# Patient Record
Sex: MALE | Race: ASIAN | NOT HISPANIC OR LATINO | Employment: FULL TIME | URBAN - METROPOLITAN AREA
[De-identification: names, ages, dates, MRNs, and addresses within clinical notes are randomized per-mention and may not be internally consistent; named-entity substitution may affect disease eponyms.]

---

## 2017-09-09 ENCOUNTER — ALLIED HEALTH/NURSE VISIT (OUTPATIENT)
Dept: NURSING | Facility: CLINIC | Age: 47
End: 2017-09-09
Payer: COMMERCIAL

## 2017-09-09 DIAGNOSIS — Z23 NEED FOR PROPHYLACTIC VACCINATION AND INOCULATION AGAINST INFLUENZA: Primary | ICD-10-CM

## 2017-09-09 PROCEDURE — 90471 IMMUNIZATION ADMIN: CPT

## 2017-09-09 PROCEDURE — 90686 IIV4 VACC NO PRSV 0.5 ML IM: CPT

## 2017-09-09 NOTE — PROGRESS NOTES
Injectable Influenza Immunization Documentation    1.  Are you sick today? (Fever of 100.5 or higher on the day of the clinic)   No    2.  Have you ever had Guillain-Dallas Syndrome within 6 weeks of an influenza vaccionation?  No    3. Do you have a life-threatening allergy to eggs?  No    4. Do you have a life-threatening allergy to a component of the vaccine? May include antibiotics, gelatin or latex.  No     5. Have you ever had a reaction to a dose of flu vaccine that needed immediate medical attention?  No     Form completed by juarez Barajas CMA

## 2017-09-09 NOTE — MR AVS SNAPSHOT
After Visit Summary   9/9/2017    Lizz Recinos    MRN: [id number]           Patient Information     Date Of Birth          1970        Visit Information        Provider Department      9/9/2017 8:15 AM RV FLU CLINIC NURSE [facility]        Today's Diagnoses     Need for prophylactic vaccination and inoculation against influenza    -  1       Follow-ups after your visit        Who to contact     If you have questions or need follow up information about today's clinic visit or your schedule please contact [facility] directly at [phone].  Normal or non-critical lab and imaging results will be communicated to you by [employer], letter or phone within 4 business days after the clinic has received the results. If you do not hear from us within 7 days, please contact the clinic through [employer] or phone. If you have a critical or abnormal lab result, we will notify you by phone as soon as possible.  Submit refill requests through [employer] or call your pharmacy and they will forward the refill request to us. Please allow 3 business days for your refill to be completed.          Additional Information About Your Visit        MyChart Information     [employer] gives you secure access to your electronic health record. If you see a primary care provider, you can also send messages to your care team and make appointments. If you have questions, please call your primary care clinic.  If you do not have a primary care provider, please call [phone] and they will assist you.        Care EveryWhere ID     This is your Care EveryWhere ID. This could be used by other organizations to access your [place] medical records  [id number]         Blood Pressure from Last 3 Encounters:   10/14/16 104/68   08/28/15 100/64   02/09/11 112/80    Weight from Last 3 Encounters:   10/14/16 113 lb (51.3 kg)   08/28/15 110 lb (49.9 kg)   02/09/11 113 lb (51.3 kg)              We Performed the  Following     FLU VAC, SPLIT VIRUS IM > 3 YO (QUADRIVALENT) [91592]     Vaccine Administration, Initial [27969]        Primary Care Provider Office Phone # Fax #    Lauri Beltrán [phone]       [facility] [facility] [address]        Equal Access to Services     MARLON DEVLIN : Hadii aad ku hadasho Soomaali, waaxda luqadaha, qaybta kaalmada adeegyada, waxay radhain hayaan aderoxy worrellgeorgiaana cristina sanchez. So [facility] [phone].    ATENCIÓN: Si habla español, tiene a vallejo disposición servicios gratuitos de asistencia lingüística. Llame al [phone].    We comply with applicable federal civil rights laws and [place] laws. We do not discriminate on the basis of race, color, national origin, age, disability sex, sexual orientation or gender identity.            Thank you!     Thank you for choosing [facility]  for your care. Our goal is always to provide you with excellent care. Hearing back from our patients is one way we can continue to improve our services. Please take a few minutes to complete the written survey that you may receive in the mail after your visit with us. Thank you!             Your Updated Medication List - Protect others around you: Learn how to safely use, store and throw away your medicines at www.disposemymeds.org.          This list is accurate as of: 9/9/17  8:24 AM.  Always use your most recent med list.                   Brand Name Dispense Instructions for use Diagnosis    MULTIVITAMINS PO      Take  by mouth daily.

## 2018-09-17 NOTE — PROGRESS NOTES
SUBJECTIVE:   CC: Lizz Recinos is an 47 year old male who presents for preventative health visit.     Healthy Habits:    Do you get at least three servings of calcium containing foods daily (dairy, green leafy vegetables, etc.)? yes    Amount of exercise or daily activities, outside of work: some    Problems taking medications regularly not applicable    Medication side effects: No    Have you had an eye exam in the past two years? yes    Do you see a dentist twice per year? yes    Do you have sleep apnea, excessive snoring or daytime drowsiness?no     Lizz is doing well but mentions feeling symptoms of a cold and a concern for strep. He complains of a hissing sound in his left ear which he doesn't notice it if he's focusing on something else. He notes that he might have a bad breath.      Today's PHQ-2 Score:   PHQ-2 ( 1999 Pfizer) 9/18/2018 10/14/2016   Q1: Little interest or pleasure in doing things 0 0   Q2: Feeling down, depressed or hopeless 0 0   PHQ-2 Score 0 0     Abuse: Current or Past(Physical, Sexual or Emotional)- No  Do you feel safe in your environment - Yes    Social History   Substance Use Topics     Smoking status: Never Smoker     Smokeless tobacco: Never Used     Alcohol use 1.0 oz/week     2 Standard drinks or equivalent per week      If you drink alcohol do you typically have >3 drinks per day or >7 drinks per week? No                      Last PSA:   PSA   Date Value Ref Range Status   08/28/2015 0.81 0 - 4 ug/L Final       Reviewed orders with patient. Reviewed health maintenance and updated orders accordingly - Yes  Labs reviewed in EPIC    Reviewed and updated as needed this visit by clinical staff  Tobacco  Allergies  Meds  Problems  Med Hx  Surg Hx  Fam Hx  Soc Hx          Reviewed and updated as needed this visit by Provider  Allergies  Meds  Problems          ROS:  Constitutional, HEENT, cardiovascular, pulmonary, GI, , musculoskeletal, neuro, skin, endocrine and psych  "systems are negative, except as otherwise noted.    This document serves as a record of the services and decisions personally performed and made by Lauri Beltrán MD. It was created on his behalf by Matt Kruse, a trained medical scribe. The creation of this document is based the provider's statements to the medical scribe.  Scribe Matt Kruse 10:40 AM, September 18, 2018    OBJECTIVE:   /70  Pulse 85  Temp 98.3  F (36.8  C) (Oral)  Ht 1.575 m (5' 2\")  Wt 49.9 kg (110 lb)  SpO2 99%  BMI 20.12 kg/m2  EXAM:  GENERAL: healthy, alert and no distress  EYES: Eyes grossly normal to inspection, PERRL and conjunctivae and sclerae normal  HENT: ear canals and TM's normal, nose and mouth without ulcers or lesions  NECK: no adenopathy, no asymmetry, masses, or scars and thyroid normal to palpation  RESP: lungs clear to auscultation - no rales, rhonchi or wheezes  CV: regular rate and rhythm, normal S1 S2, no S3 or S4, no murmur, click or rub, no peripheral edema and peripheral pulses strong  ABDOMEN: soft, nontender, no hepatosplenomegaly, no masses and bowel sounds normal   (male): normal male genitalia without lesions or urethral discharge, no hernia  RECTAL: Pt declined   MS: no gross musculoskeletal defects noted, no edema  SKIN: no suspicious lesions or rashes  NEURO: Normal strength and tone, mentation intact and speech normal  PSYCH: mentation appears normal, affect normal/bright    ASSESSMENT/PLAN:   Lizz was seen today for physical.    Diagnoses and all orders for this visit:    Routine general medical examination at a health care facility    Halitosis - Behavior changes advised     Encounter for immunization - Administered today in clinic  -     HC FLU VAC PRESRV FREE QUAD SPLIT VIR 3+YRS IM  -     ADMIN 1st VACCINE    Screen for colon cancer  -     GASTROENTEROLOGY ADULT REF PROCEDURE ONLY Alejo Muniz [phone]; [facility]      COUNSELING:  Reviewed preventive health counseling, as " "reflected in patient instructions       Regular exercise       Healthy diet/nutrition       HIV screeninx in teen years, 1x in adult years, and at intervals if high risk       Colon cancer screening       Prostate cancer screening    BP Readings from Last 1 Encounters:   18 100/70     Estimated body mass index is 20.12 kg/(m^2) as calculated from the following:    Height as of this encounter: 1.575 m (5' 2\").    Weight as of this encounter: 49.9 kg (110 lb).     reports that he has never smoked. He has never used smokeless tobacco.      Counseling Resources:  ATP IV Guidelines  Pooled Cohorts Equation Calculator  FRAX Risk Assessment  ICSI Preventive Guidelines  Dietary Guidelines for Americans, 2010  USDA's MyPlate  ASA Prophylaxis  Lung CA Screening    The information in this document, created by the medical scribe for me, accurately reflects the services I personally performed and the decisions made by me. I have reviewed and approved this document for accuracy prior to leaving the patient care area.  10:40 AM, 18      Lauri Beltrán MD  [facility] [place]  "

## 2018-09-18 ENCOUNTER — OFFICE VISIT (OUTPATIENT)
Dept: FAMILY MEDICINE | Facility: CLINIC | Age: 48
End: 2018-09-18
Payer: COMMERCIAL

## 2018-09-18 VITALS
HEART RATE: 85 BPM | BODY MASS INDEX: 20.24 KG/M2 | TEMPERATURE: 98.3 F | WEIGHT: 110 LBS | OXYGEN SATURATION: 99 % | SYSTOLIC BLOOD PRESSURE: 100 MMHG | HEIGHT: 62 IN | DIASTOLIC BLOOD PRESSURE: 70 MMHG

## 2018-09-18 DIAGNOSIS — R19.6 HALITOSIS: ICD-10-CM

## 2018-09-18 DIAGNOSIS — Z00.00 ROUTINE GENERAL MEDICAL EXAMINATION AT A HEALTH CARE FACILITY: Primary | ICD-10-CM

## 2018-09-18 DIAGNOSIS — Z23 ENCOUNTER FOR IMMUNIZATION: ICD-10-CM

## 2018-09-18 DIAGNOSIS — Z12.11 SCREEN FOR COLON CANCER: ICD-10-CM

## 2018-09-18 PROCEDURE — 90686 IIV4 VACC NO PRSV 0.5 ML IM: CPT | Performed by: FAMILY MEDICINE

## 2018-09-18 PROCEDURE — 99396 PREV VISIT EST AGE 40-64: CPT | Mod: 25 | Performed by: FAMILY MEDICINE

## 2018-09-18 PROCEDURE — 90471 IMMUNIZATION ADMIN: CPT | Performed by: FAMILY MEDICINE

## 2018-09-18 NOTE — MR AVS SNAPSHOT
After Visit Summary   9/18/2018    Lizz Recinos    MRN: [id number]           Patient Information     Date Of Birth          1970        Visit Information        Provider Department      9/18/2018 10:20 AM Lauri Beltrán MD [facility] [place]        Today's Diagnoses     Routine general medical examination at a health care facility    -  1    Screening for HIV (human immunodeficiency virus)        Encounter for immunization        Halitosis          Care Instructions      Preventive Health Recommendations  Male Ages 40 to 49    Yearly exam:             See your health care provider every year in order to  o   Review health changes.   o   Discuss preventive care.    o   Review your medicines if your doctor has prescribed any.    You should be tested each year for STDs (sexually transmitted diseases) if you re at risk.     Have a cholesterol test every 5 years.     Have a colonoscopy (test for colon cancer) if someone in your family has had colon cancer or polyps before age 50.     After age 45, have a diabetes test (fasting glucose). If you are at risk for diabetes, you should have this test every 3 years.      Talk with your health care provider about whether or not a prostate cancer screening test (PSA) is right for you.    Shots: Get a flu shot each year. Get a tetanus shot every 10 years.     Nutrition:    Eat at least 5 servings of fruits and vegetables daily.     Eat whole-grain bread, whole-wheat pasta and brown rice instead of white grains and rice.     Get adequate Calcium and Vitamin D.     Lifestyle    Exercise for at least 150 minutes a week (30 minutes a day, 5 days a week). This will help you control your weight and prevent disease.     Limit alcohol to one drink per day.     No smoking.     Wear sunscreen to prevent skin cancer.     See your dentist every six months for an exam and cleaning.              Follow-ups after your visit        Follow-up notes from your care  "team     Return in about 1 year (around 9/18/2019) for Wellness Exam and fasting labs.      Who to contact     If you have questions or need follow up information about today's clinic visit or your schedule please contact [facility] directly at [phone].  Normal or non-critical lab and imaging results will be communicated to you by MyChart, letter or phone within 4 business days after the clinic has received the results. If you do not hear from us within 7 days, please contact the clinic through [employer] or phone. If you have a critical or abnormal lab result, we will notify you by phone as soon as possible.  Submit refill requests through [email or web address]) or call your pharmacy and they will forward the refill request to us. Please allow 3 business days for your refill to be completed.          Additional Information About Your Visit        [employer] Information     [email or web address] gives you secure access to your electronic health record. If you see a primary care provider, you can also send messages to your care team and make appointments. If you have questions, please call your primary care clinic.  If you do not have a primary care provider, please call [phone] and they will assist you.        Care EveryWhere ID     This is your Care EveryWhere ID. This could be used by other organizations to access your [place] medical records  [id number]        Your Vitals Were     Pulse Temperature Height Pulse Oximetry BMI (Body Mass Index)       85 98.3  F (36.8  C) (Oral) 5' 2\" (1.575 m) 99% 20.12 kg/m2        Blood Pressure from Last 3 Encounters:   09/18/18 100/70   10/14/16 104/68   08/28/15 100/64    Weight from Last 3 Encounters:   09/18/18 110 lb (49.9 kg)   10/14/16 113 lb (51.3 kg)   08/28/15 110 lb (49.9 kg)              We Performed the Following     ADMIN 1st VACCINE     HC FLU VAC PRESRV FREE QUAD SPLIT VIR 3+YRS IM        Primary Care Provider Office Phone # Fax #    Lauri Beltrán [phone] " [phone]       [address] [facility] [address]        Equal Access to Services     MARLON DEVLIN : Hadii aad ku hadsinanitha Ryder, wabongda ivanaadaha, qaybta kamarkda sylviasulaimanshayan, waxay idiin haykailatemo worrellgeorgiaana cristina sanchez. So [facility] [phone].    ATENCIÓN: Si habla español, tiene a vallejo disposición servicios gratuitos de asistencia lingüística. Llame al [phone].    We comply with applicable federal civil rights laws and [place] laws. We do not discriminate on the basis of race, color, national origin, age, disability, sex, sexual orientation, or gender identity.            Thank you!     Thank you for choosing [facility]  for your care. Our goal is always to provide you with excellent care. Hearing back from our patients is one way we can continue to improve our services. Please take a few minutes to complete the written survey that you may receive in the mail after your visit with us. Thank you!             Your Updated Medication List - Protect others around you: Learn how to safely use, store and throw away your medicines at www.disposemymeds.org.          This list is accurate as of 9/18/18 10:54 AM.  Always use your most recent med list.                   Brand Name Dispense Instructions for use Diagnosis    MULTIVITAMINS PO      Take  by mouth daily.

## 2019-04-08 ENCOUNTER — TRANSFERRED RECORDS (OUTPATIENT)
Dept: HEALTH INFORMATION MANAGEMENT | Facility: CLINIC | Age: 49
End: 2019-04-08

## 2019-04-08 LAB
CREAT SERPL-MCNC: 0.82 MG/DL (ref 0.72–1.25)
GFR SERPL CREATININE-BSD FRML MDRD: >60 ML/MIN/1.73M2
GLUCOSE SERPL-MCNC: 107 MG/DL (ref 65–100)
POTASSIUM SERPL-SCNC: 4 MMOL/L (ref 3.5–5)

## 2019-04-11 ENCOUNTER — ALLIED HEALTH/NURSE VISIT (OUTPATIENT)
Dept: NURSING | Facility: CLINIC | Age: 49
End: 2019-04-11
Payer: COMMERCIAL

## 2019-04-11 DIAGNOSIS — R42 DIZZINESS: Primary | ICD-10-CM

## 2019-04-11 NOTE — PROGRESS NOTES
SUBJECTIVE:   Lizz Recinos is a 48 year old male who presents to clinic today for the following   health issues:    Intermittent Dizziness  Onset: 4/8/2019    Description:   Do you feel faint:  no   Does it feel like the surroundings (bed, room) are moving: no   Unsteady/off balance: YES- slightly  Have you passed out or fallen: YES- Fell at work on Monday    Intensity: moderate    Progression of Symptoms:  intermittent    Accompanying Signs & Symptoms:  Heart palpitations: no   Nausea, vomiting: no   Weakness in arms or legs: no   Fatigue: no   Vision or speech changes: no   Ringing in ears (Tinnitus): YES- left side  Hearing Loss: no     History:   Head trauma/concussion hx: no   Previous similar symptoms: YES- last week and previously  Recent bleeding history: no     Precipitating factors:   Worse with activity or head movement: no   Any new medications (BP?): YES- only anti nausea or motion sickness tabs  Alcohol/drug abuse/withdrawal: no     Alleviating factors:   Does staying in a fixed position give relief:  no     Therapies Tried and outcome: recent ER visit- not effective, anti dizziness medication ONLY once daily- minimally effective    4/8/19- seen for these sx in ER. Notes below.  48-year-old male presents with a few episodes of dizziness over the last day or 2, this sounds to be peripheral in origin, and a near syncopal episode at work today. He does not have any ongoing complaints at this time. Suspect a possible otolith versus some mild labyrinthitis. Plan will be for reassurance and a trial of meclizine with referral back to primary care in a week if ongoing complaints    Pt reported at this time they are not dizzy. They wanted to follow up with RL earlier than this coming Monday. 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  Pt declined vitals at this time.     Rescheduled pt for Friday with RL for evaluation. Advised pt to take the TID meclizine to see if this helps their symptoms  more, hydrate, change positions slowly, call the clinic with any further changes or symptoms. The patient indicates understanding of these issues and agrees with the plan.    Chaparrita Hansen RN  [facility]

## 2019-04-12 ENCOUNTER — OFFICE VISIT (OUTPATIENT)
Dept: FAMILY MEDICINE | Facility: CLINIC | Age: 49
End: 2019-04-12
Payer: COMMERCIAL

## 2019-04-12 VITALS
RESPIRATION RATE: 16 BRPM | SYSTOLIC BLOOD PRESSURE: 130 MMHG | BODY MASS INDEX: 20.24 KG/M2 | HEIGHT: 62 IN | OXYGEN SATURATION: 96 % | DIASTOLIC BLOOD PRESSURE: 70 MMHG | TEMPERATURE: 98.9 F | HEART RATE: 103 BPM | WEIGHT: 110 LBS

## 2019-04-12 DIAGNOSIS — R42 VERTIGO: Primary | ICD-10-CM

## 2019-04-12 DIAGNOSIS — Z12.11 SCREEN FOR COLON CANCER: ICD-10-CM

## 2019-04-12 PROCEDURE — 99213 OFFICE O/P EST LOW 20 MIN: CPT | Mod: 25 | Performed by: FAMILY MEDICINE

## 2019-04-12 PROCEDURE — 95992 CANALITH REPOSITIONING PROC: CPT | Performed by: FAMILY MEDICINE

## 2019-04-12 ASSESSMENT — MIFFLIN-ST. JEOR: SCORE: 1238.21

## 2019-04-12 NOTE — PATIENT INSTRUCTIONS
Patient Education     Benign Paroxysmal Positional Vertigo     Your health care provider may move your head in certain ways to treat your BPPV.     Benign paroxysmal positional vertigo (BPPV) is a problem with the inner ear. The inner ear contains the vestibular system. This system is what helps you keep your balance. BPPV causes a feeling of spinning. It is a common problem of the vestibular system.  Understanding the vestibular system  The vestibular system of the ear is made up of very tiny parts. They include the utricle, saccule, and semicircular canals. The utricle is a tiny organ that contains calcium crystals. In some people, the crystals can move into the semicircular canals. When this happens, the system no longer works as it should. This causes BPPV. Benign means it is not life threatening. Paroxysmal means it happens suddenly. Positional means that it happens when you move your head. Vertigo is a feeling of spinning.  What causes BPPV?  Causes include injury to your head or neck. Other problems with the vestibular system may cause BPPV. In many people, the cause of BPPV is not known.  Symptoms of BPPV  You many have repeated feelings of spinning (vertigo). The vertigo usually lasts less than 1 minute. Some movements, such as rolling over in bed, can bring on vertigo.  Diagnosing BPPV  Your primary healthcare provider may diagnose and treat your BPPV. Or you may see an ear, nose, and throat doctor (otolaryngologist). In some cases, you may see a nervous system doctor (neurologist).  The healthcare provider will ask about your symptoms and your medical history. He or she will examine you. You may have hearing and balance tests. As part of the exam, your healthcare provider may have you move your head and body in certain ways. If you have BPPV, the movements can bring on vertigo. Your provider will also look for abnormal movements of your eyes. You may have other tests to check your vestibular or nervous  systems.  Treatment for BPPV  Your healthcare provider may try to move the calcium crystals. This is done by having you move your head and neck in certain ways. This treatment is safe and often works well. You may also be told to do these movements at home. You may still have vertigo for a few weeks. Your healthcare provider will recheck your symptoms, usually in about a month. Special physical therapy may also be part of treatment. In rare cases, surgery may be needed for BPPV that does not go away.     When to call the healthcare provider  Call your healthcare provider right away if you have any of these:    Symptoms that do not go away with treatment    Symptoms that get worse    New symptoms   Date Last Reviewed: 5/1/2017 2000-2018 [employer]. [address] [place] [address], [place] [address]. All rights reserved. This information is not intended as a substitute for professional medical care. Always follow your healthcare professional's instructions.           Patient Education     Benign Paroxysmal Positional Vertigo     Your health care provider may move your head in certain ways to treat your BPPV.     Benign paroxysmal positional vertigo (BPPV) is a problem with the inner ear. The inner ear contains the vestibular system. This system is what helps you keep your balance. BPPV causes a feeling of spinning. It is a common problem of the vestibular system.  Understanding the vestibular system  The vestibular system of the ear is made up of very tiny parts. They include the utricle, saccule, and semicircular canals. The utricle is a tiny organ that contains calcium crystals. In some people, the crystals can move into the semicircular canals. When this happens, the system no longer works as it should. This causes BPPV. Benign means it is not life threatening. Paroxysmal means it happens suddenly. Positional means that it happens when you move your head. Vertigo is a feeling of spinning.  What causes  BPPV?  Causes include injury to your head or neck. Other problems with the vestibular system may cause BPPV. In many people, the cause of BPPV is not known.  Symptoms of BPPV  You many have repeated feelings of spinning (vertigo). The vertigo usually lasts less than 1 minute. Some movements, such as rolling over in bed, can bring on vertigo.  Diagnosing BPPV  Your primary healthcare provider may diagnose and treat your BPPV. Or you may see an ear, nose, and throat doctor (otolaryngologist). In some cases, you may see a nervous system doctor (neurologist).  The healthcare provider will ask about your symptoms and your medical history. He or she will examine you. You may have hearing and balance tests. As part of the exam, your healthcare provider may have you move your head and body in certain ways. If you have BPPV, the movements can bring on vertigo. Your provider will also look for abnormal movements of your eyes. You may have other tests to check your vestibular or nervous systems.  Treatment for BPPV  Your healthcare provider may try to move the calcium crystals. This is done by having you move your head and neck in certain ways. This treatment is safe and often works well. You may also be told to do these movements at home. You may still have vertigo for a few weeks. Your healthcare provider will recheck your symptoms, usually in about a month. Special physical therapy may also be part of treatment. In rare cases, surgery may be needed for BPPV that does not go away.     When to call the healthcare provider  Call your healthcare provider right away if you have any of these:    Symptoms that do not go away with treatment    Symptoms that get worse    New symptoms   Date Last Reviewed: 5/1/2017 2000-2018 [employer]. [address] [place] [id number]. All rights reserved. This information is not intended as a substitute for professional medical care. Always follow your healthcare  professional's instructions.           Patient Education     Benign Paroxysmal Positional Vertigo    Benign paroxysmal positional vertigo is a common condition. You feel as if the room is spinning after changing position, moving your head quickly, or even just rolling over in bed.  Vertigo is a false feeling of motion plus disorientation that makes it seem as though the room is spinning. A vertigo attack may cause sudden nausea, vomiting, and heavy sweating. Severe vertigo causes a loss of balance. You may even fall down.  Vertigo is caused by a problem with the inner ear. The inner ear is located behind the middle ear. It is a part of the balance center of the body. It contains small calcium particles within fluid-filled canals (semi-circular canals). These particles can move out of position. This may happen as a result of aging, head injury, or disease of the inner ear. Once that happens, moving your head in certain ways may cause the particles to stimulate the inner ear. This creates the feeling of vertigo.  An episode of vertigo may last seconds, minutes, or hours. Once you are over the first episode of vertigo, it may never return. Sometimes symptoms return off and on for several weeks or longer.  Home care  Follow these guidelines when caring for yourself at home:    Rest quietly in bed if your symptoms are severe. Change position slowly. There is usually 1 position that will feel best. This might be lying on 1 side or lying on your back with your head slightly raised on pillows. Until you have no symptoms, you are at a higher risk of falling. Let someone help you when you get up. Get rid of home hazards such as loose electrical cords and throw rugs. Don t walk in unfamiliar areas that are not lighted. Use night lights in bathrooms and kitchen areas.    Do not drive or work with dangerous machinery for 1 week after symptoms go away. This is in case symptoms return suddenly.    Take medicine as prescribed to  relieve your symptoms. Unless another medicine was prescribed for nausea, vomiting, and vertigo, you may use over-the-counter motion sickness medicine. Examples of this include meclizine and dimenhydrinate.  Follow-up care  Follow up with your healthcare provider, or as directed. Tell your provider about any ringing in your ear or hearing loss.  If you had a CT or MRI scan, a specialist will review it. You will be told of any new findings that may affect your care.  When to seek medical advice  Call your healthcare provider right away if any of these occur:    Vertigo gets worse even after taking prescribed medicine    Repeated vomiting even after taking prescribed medicine    Weakness that gets worse    Fainting    Severe headache or unusual drowsiness or confusion    Weakness of an arm or leg or 1 side of the face    Trouble walking    Trouble with speech or vision    Seizure    Trouble hearing    Fever of 100.4 F (38 C) or higher, or as directed by your healthcare provider    Fast heart rate    Chest pain   Date Last Reviewed: 11/1/2017 2000-2018 [employer]. [address] [place] [address], [place] [address]. All rights reserved. This information is not intended as a substitute for professional medical care. Always follow your healthcare professional's instructions.

## 2019-04-12 NOTE — PROGRESS NOTES
SUBJECTIVE:                                                      Lizz Recinos is a 48 year old male who presents to clinic today for the following health issues:    Triaged 04/11/2019 1 day ago- pt still feels dizzy when up and moving- a little off when sitting- ringing in ear- pt took motion sickness meds 3 times a day and it did help some not alot    Intermittent Dizziness  Onset: 4/8/2019    Description:   Do you feel faint:  no   Does it feel like the surroundings (bed, room) are moving: no   Unsteady/off balance: YES- slightly  Have you passed out or fallen: YES- Fell at work on Monday    Intensity: moderate    Progression of Symptoms:  intermittent    Accompanying Signs & Symptoms:  Heart palpitations: no   Nausea, vomiting: no   Weakness in arms or legs: no   Fatigue: no   Vision or speech changes: no   Ringing in ears (Tinnitus): YES- left side  Hearing Loss: no   No fever or cough     History:   Head trauma/concussion hx: no   Previous similar symptoms: YES- last week and previously  Recent bleeding history: no     Precipitating factors:   Worse with activity or head movement: no   Any new medications (BP?): YES- only anti nausea or motion sickness tabs  Alcohol/drug abuse/withdrawal: no     Alleviating factors:   Does staying in a fixed position give relief:  no      Therapies Tried and outcome: recent ER visit- not effective, anti dizziness medication ONLY once daily- minimally effective     4/8/19- seen for these sx in ER. Notes below.  48-year-old male presents with a few episodes of dizziness over the last day or 2, this sounds to be peripheral in origin, and a near syncopal episode at work today. He does not have any ongoing complaints at this time. Suspect a possible otolith versus some mild labyrinthitis. Plan will be for reassurance and a trial of meclizine with referral back to primary care in a week if ongoing complaints     Pt reported at this time they are not dizzy. They wanted to follow up  "with RL earlier than this coming Monday. 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  Pt declined vitals at this time.      Rescheduled pt for Friday with RL for evaluation. Advised pt to take the TID meclizine to see if this helps their symptoms more, hydrate, change positions slowly, call the clinic with any further changes or symptoms. The patient indicates understanding of these issues and agrees with the plan.       Since ED visit ongoing symptoms and worse with head movement - meclizine may help slightly.   Ongoing left tinnitus - some decreased hearing but his is chronic.       Problem list and histories reviewed & adjusted, as indicated.  Additional history: as documented    ROS:  Constitutional, HEENT, cardiovascular, pulmonary, GI, , musculoskeletal, neuro, skin, endocrine and psych systems are negative, except as otherwise noted.    OBJECTIVE:                                                      /70   Pulse 103   Temp 98.9  F (37.2  C) (Oral)   Resp 16   Ht 1.575 m (5' 2\")   Wt 49.9 kg (110 lb)   SpO2 96%   BMI 20.12 kg/m   Body mass index is 20.12 kg/m .   GENERAL: healthy, alert, well nourished, well hydrated, no distress  HENT: increased vertigo on the right D-H and Epley maneuver done and feels less symptomatic otherwise ear canals- normal; TMs- normal; Nose- normal; Mouth- no ulcers, no lesions  NECK: no tenderness, no adenopathy, no asymmetry, no masses, no stiffness; thyroid- normal to palpation  RESP: lungs clear to auscultation - no rales, no rhonchi, no wheezes  CV: regular rates and rhythm, normal S1 S2, no S3 or S4 and no murmur, no click or rub -  ABDOMEN: soft, no tenderness, no  hepatosplenomegaly, no masses, normal bowel sounds  MS: extremities- no gross deformities noted, no edema  SKIN: no suspicious lesions, no rashes  NEURO: strength and tone- normal, sensory exam- grossly normal, mentation- intact, speech- normal, reflexes- symmetric  PSYCH: Alert and " oriented times 3; speech- coherent , normal rate and volume; able to articulate logical thoughts, able to abstract reason, no tangential thoughts, no hallucinations or delusions, affect- normal    Diagnostic Test Results  Reviewed labs and ekg    ASSESSMENT/PLAN:                                                      Lizz was seen today for er f/u and dizziness.    Diagnoses and all orders for this visit:    Vertigo - peripheral - epley done and feels better , can repeat at home prn.     Screen for colon cancer  -     Fecal colorectal cancer screen (FIT); Future        Risks, benefits and alternatives of treatments discussed. Plan agreed on.      Followup: Return in about 2 weeks (around 4/26/2019), or if symptoms worsen or fail to improve, for recheck.    See patient instructions.           Armond Beltrán MD   Pager: [phone]

## 2019-04-12 NOTE — LETTER
[facility]  [address] [place] [address]                                                                                                       [phone]    April 12, 2019    Lizznasima Recinos  [address] [place] [id number]      To Whom it May Concern:    The above patient is unable to attend work for 4/8/19, 4/9/19, 4/11/19 afternoon, 4/12/19 (and 4/15/19 through 4/19/19 if needed) due to a medical issue. Please contact me with questions or concerns.      Sincerely,                  Armond Beltrán M.D.

## 2019-04-19 DIAGNOSIS — Z12.11 SCREEN FOR COLON CANCER: ICD-10-CM

## 2019-04-20 LAB — HEMOCCULT STL QL IA: NEGATIVE

## 2019-04-20 PROCEDURE — 82274 ASSAY TEST FOR BLOOD FECAL: CPT | Performed by: FAMILY MEDICINE

## 2019-05-07 ENCOUNTER — OFFICE VISIT (OUTPATIENT)
Dept: FAMILY MEDICINE | Facility: CLINIC | Age: 49
End: 2019-05-07
Payer: COMMERCIAL

## 2019-05-07 VITALS
WEIGHT: 112 LBS | RESPIRATION RATE: 14 BRPM | DIASTOLIC BLOOD PRESSURE: 80 MMHG | OXYGEN SATURATION: 100 % | SYSTOLIC BLOOD PRESSURE: 110 MMHG | HEART RATE: 86 BPM | BODY MASS INDEX: 20.61 KG/M2 | HEIGHT: 62 IN

## 2019-05-07 DIAGNOSIS — Q28.2 AVM (ARTERIOVENOUS MALFORMATION) BRAIN: ICD-10-CM

## 2019-05-07 DIAGNOSIS — R42 DIZZINESS: Primary | ICD-10-CM

## 2019-05-07 DIAGNOSIS — J32.4 CHRONIC PANSINUSITIS: ICD-10-CM

## 2019-05-07 LAB
BASOPHILS # BLD AUTO: 0 10E9/L (ref 0–0.2)
BASOPHILS NFR BLD AUTO: 0.4 %
DIFFERENTIAL METHOD BLD: ABNORMAL
EOSINOPHIL # BLD AUTO: 0 10E9/L (ref 0–0.7)
EOSINOPHIL NFR BLD AUTO: 0.7 %
ERYTHROCYTE [DISTWIDTH] IN BLOOD BY AUTOMATED COUNT: 12 % (ref 10–15)
ERYTHROCYTE [SEDIMENTATION RATE] IN BLOOD BY WESTERGREN METHOD: 12 MM/H (ref 0–20)
HBA1C MFR BLD: 5.2 % (ref 0–5.6)
HCT VFR BLD AUTO: 38.9 % (ref 40–53)
HGB BLD-MCNC: 13.6 G/DL (ref 13.3–17.7)
LYMPHOCYTES # BLD AUTO: 2 10E9/L (ref 0.8–5.3)
LYMPHOCYTES NFR BLD AUTO: 35.9 %
MCH RBC QN AUTO: 31.4 PG (ref 26.5–33)
MCHC RBC AUTO-ENTMCNC: 35 G/DL (ref 31.5–36.5)
MCV RBC AUTO: 90 FL (ref 78–100)
MONOCYTES # BLD AUTO: 0.6 10E9/L (ref 0–1.3)
MONOCYTES NFR BLD AUTO: 10.3 %
NEUTROPHILS # BLD AUTO: 3 10E9/L (ref 1.6–8.3)
NEUTROPHILS NFR BLD AUTO: 52.7 %
PLATELET # BLD AUTO: 199 10E9/L (ref 150–450)
RBC # BLD AUTO: 4.33 10E12/L (ref 4.4–5.9)
WBC # BLD AUTO: 5.7 10E9/L (ref 4–11)

## 2019-05-07 PROCEDURE — 85652 RBC SED RATE AUTOMATED: CPT | Performed by: PHYSICIAN ASSISTANT

## 2019-05-07 PROCEDURE — 83036 HEMOGLOBIN GLYCOSYLATED A1C: CPT | Performed by: PHYSICIAN ASSISTANT

## 2019-05-07 PROCEDURE — 80053 COMPREHEN METABOLIC PANEL: CPT | Performed by: PHYSICIAN ASSISTANT

## 2019-05-07 PROCEDURE — 36415 COLL VENOUS BLD VENIPUNCTURE: CPT | Performed by: PHYSICIAN ASSISTANT

## 2019-05-07 PROCEDURE — 84443 ASSAY THYROID STIM HORMONE: CPT | Performed by: PHYSICIAN ASSISTANT

## 2019-05-07 PROCEDURE — 85025 COMPLETE CBC W/AUTO DIFF WBC: CPT | Performed by: PHYSICIAN ASSISTANT

## 2019-05-07 PROCEDURE — 99214 OFFICE O/P EST MOD 30 MIN: CPT | Performed by: PHYSICIAN ASSISTANT

## 2019-05-07 ASSESSMENT — PAIN SCALES - GENERAL: PAINLEVEL: NO PAIN (0)

## 2019-05-07 ASSESSMENT — MIFFLIN-ST. JEOR: SCORE: 1239.34

## 2019-05-07 NOTE — PROGRESS NOTES
SUBJECTIVE:   Lizz Recinos is a 48 year old male who presents to clinic today for the following   health issues:      Dizziness      Duration: 1-2 weeks    Description   Feeling faint:  no   Feeling like the surroundings are moving: YES- back and forth motion  Loss of consciousness or falls: no     Intensity:  moderate    Accompanying signs and symptoms:   Nausea/vomitting: no   Palpitations: no   Weakness in arms or legs: no   Vision or speech changes: no   Ringing in ears (Tinnitus): YES  Hearing loss related to dizziness: no   Other (fevers/chills/sweating/dyspnea): YES    History (similar episodes/head trauma/previous evaluation/recent bleeding): None    Precipitating or alleviating factors (new meds/chemicals): None  Worse with activity/head movement: no     Therapies tried and outcome: None    Lizz was seen on 4/8/2019 in ER following fall due to a near syncopal episode secondary to dizziness and tinnitus. He was recommended a trial of meclizine and referral back to primary care in a week if sxs persist.     He was then seen on 4/12/2019 by Dr. Beltrán with ongoing sxs exacerbated by head movements. Lizz reported meclizine only helped slightly. Ongoing left tinnitus - some decreased hearing but his is chronic. Performed Epley maneuver at this visit - slightly effective.     Today, Lizz reports sxs are still intermittent. Focusing is very difficult. Turning head, and body position does not aggravate dizzy sxs. Only took the meclizine a few times due to dry mouth side effect and meclizine did not seem to be effective. Has not had imaging work up for dizziness. Denies feeling nauseated secondary to dizziness. Follows with eye doctor regularly. Reports since his fall on 4/8/2019 he feels his body temperature is elevated.        Additional history: as documented    Reviewed  and updated as needed this visit by clinical staff  Tobacco  Allergies  Meds  Problems  Med Hx  Surg Hx  Fam Hx  Soc Hx           Reviewed and updated as needed this visit by Provider  Tobacco  Allergies  Meds  Problems  Med Hx  Surg Hx  Fam Hx  Soc Hx          Patient Active Problem List   Diagnosis     CARDIOVASCULAR SCREENING; LDL GOAL LESS THAN 160     Vertigo     Past Surgical History:   Procedure Laterality Date     NO HISTORY OF SURGERY         Social History     Tobacco Use     Smoking status: Never Smoker     Smokeless tobacco: Never Used   Substance Use Topics     Alcohol use: Yes     Alcohol/week: 1.0 oz     Types: 2 Standard drinks or equivalent per week     Family History   Problem Relation Age of Onset     No Known Problems Brother      No Known Problems Brother      No Known Problems Brother      No Known Problems Brother      No Known Problems Sister      No Known Problems Sister      No Known Problems Sister      No Known Problems Daughter      No Known Problems Son      No Known Problems Son      Colon Cancer No family hx of      Prostate Cancer No family hx of      Diabetes No family hx of      Coronary Artery Disease No family hx of      Cerebrovascular Disease No family hx of      Hypertension No family hx of      Breast Cancer No family hx of          Current Outpatient Medications   Medication Sig Dispense Refill     Multiple Vitamin (MULTIVITAMINS PO) Take  by mouth daily.       No Known Allergies    ROS:  Constitutional, HEENT, cardiovascular, pulmonary, GI, , musculoskeletal, neuro, skin, endocrine and psych systems are negative, except as otherwise noted.    This document serves as a record of the services and decisions personally performed and made by CLAIR Houser. It was created on her behalf by Miryam Dorado, a trained medical scribe. The creation of this document is based on the provider's statements to the medical scribe.  Miryam Dorado May 7, 2019 3:54 PM      OBJECTIVE:     /80 (BP Location: Right arm, Patient Position: Sitting, Cuff Size: Adult Regular)   Pulse 86   Resp 14    "Ht 1.562 m (5' 1.5\")   Wt 50.8 kg (112 lb)   SpO2 100%   BMI 20.82 kg/m    Body mass index is 20.82 kg/m .  GENERAL: healthy, alert and no distress  EYES: Difficulty tracking laterally but no nystagmus, eyes grossly normal to inspection, PERRL and conjunctivae and sclerae normal  HENT: ear canals and TM's normal, nose and mouth without ulcers or lesions  NECK: no adenopathy, no asymmetry, masses, or scars and thyroid normal to palpation  RESP: lungs clear to auscultation - no rales, rhonchi or wheezes  CV: regular rate and rhythm, normal S1 S2, no S3 or S4, no murmur, click or rub, no peripheral edema and peripheral pulses strong  MS: no gross musculoskeletal defects noted, no edema  SKIN: no suspicious lesions or rashes  NEURO: cranial nerves II-XII grossly intact, point to point motions intact, RRM intact, able to heel toe walk, able to hop on one foot, and negative Romberg   PSYCH: mentation appears normal, affect normal/bright    Diagnostic Test Results:  Results for orders placed or performed in visit on 05/07/19 (from the past 24 hour(s))   Hemoglobin A1c   Result Value Ref Range    Hemoglobin A1C 5.2 0 - 5.6 %   CBC with platelets and differential   Result Value Ref Range    WBC 5.7 4.0 - 11.0 10e9/L    RBC Count 4.33 (L) 4.4 - 5.9 10e12/L    Hemoglobin 13.6 13.3 - 17.7 g/dL    Hematocrit 38.9 (L) 40.0 - 53.0 %    MCV 90 78 - 100 fl    MCH 31.4 26.5 - 33.0 pg    MCHC 35.0 31.5 - 36.5 g/dL    RDW 12.0 10.0 - 15.0 %    Platelet Count 199 150 - 450 10e9/L    % Neutrophils 52.7 %    % Lymphocytes 35.9 %    % Monocytes 10.3 %    % Eosinophils 0.7 %    % Basophils 0.4 %    Absolute Neutrophil 3.0 1.6 - 8.3 10e9/L    Absolute Lymphocytes 2.0 0.8 - 5.3 10e9/L    Absolute Monocytes 0.6 0.0 - 1.3 10e9/L    Absolute Eosinophils 0.0 0.0 - 0.7 10e9/L    Absolute Basophils 0.0 0.0 - 0.2 10e9/L    Diff Method Automated Method    ESR: Erythrocyte sedimentation rate   Result Value Ref Range    Sed Rate 12 0 - 20 mm/h "       ASSESSMENT/PLAN:     Lizz was seen today for dizziness.    Diagnoses and all orders for this visit:    Dizziness  Etiology unclear. Will check labs today for underlying metabolic causes and start imaging workup. If imaging and labs are normal will refer to vestibular therapy. If sxs worsen or persist call clinic for further treatment.  -     TSH with free T4 reflex  -     MR Brain w/o & w Contrast; Future  -     Comprehensive metabolic panel  -     Hemoglobin A1c  -     CBC with platelets and differential  -     ESR: Erythrocyte sedimentation rate      Return in about 2 weeks (around 5/21/2019) for for MRI.    The information in this document, created by the medical scribe for me, accurately reflects the services I personally performed and the decisions made by me. I have reviewed and approved this document for accuracy prior to leaving the patient care area.  May 7, 2019 3:54 PM      Estefany Phelps PA-C  [facility]

## 2019-05-08 LAB
ALBUMIN SERPL-MCNC: 3.8 G/DL (ref 3.4–5)
ALP SERPL-CCNC: 56 U/L (ref 40–150)
ALT SERPL W P-5'-P-CCNC: 19 U/L (ref 0–70)
ANION GAP SERPL CALCULATED.3IONS-SCNC: 7 MMOL/L (ref 3–14)
AST SERPL W P-5'-P-CCNC: 14 U/L (ref 0–45)
BILIRUB SERPL-MCNC: 0.2 MG/DL (ref 0.2–1.3)
BUN SERPL-MCNC: 13 MG/DL (ref 7–30)
CALCIUM SERPL-MCNC: 8.7 MG/DL (ref 8.5–10.1)
CHLORIDE SERPL-SCNC: 105 MMOL/L (ref 94–109)
CO2 SERPL-SCNC: 26 MMOL/L (ref 20–32)
CREAT SERPL-MCNC: 0.86 MG/DL (ref 0.66–1.25)
GFR SERPL CREATININE-BSD FRML MDRD: >90 ML/MIN/{1.73_M2}
GLUCOSE SERPL-MCNC: 110 MG/DL (ref 70–99)
POTASSIUM SERPL-SCNC: 3.8 MMOL/L (ref 3.4–5.3)
PROT SERPL-MCNC: 7.7 G/DL (ref 6.8–8.8)
SODIUM SERPL-SCNC: 138 MMOL/L (ref 133–144)
TSH SERPL DL<=0.005 MIU/L-ACNC: 2.13 MU/L (ref 0.4–4)

## 2019-05-08 NOTE — RESULT ENCOUNTER NOTE
Lizz  I have reviewed your recent labs. Here are the results:    -Normal red blood cell (hgb) levels, normal white blood cell count and normal platelet levels.    -Liver and gallbladder tests are normal (ALT,AST, Alk phos, bilirubin), kidney function is normal (Cr, GFR), sodium is normal, potassium is normal, calcium is normal, glucose is elevated but this is normal for a non fasting specimen.  -A1C (diabetic test) is normal and indicates that your blood sugar has been in a normal range the last 3 months.  -TSH (thyroid stimulating hormone) level is normal which indicates normal thyroid function.  -ESR (inflammatory marker) is normal.    For additional lab test information, labtestsonline.org is an excellent reference.      If you have any questions please do not hesitate to contact our office via phone ([phone]) or MyChart.    Estefany Phelps, MS, PA-C  [facility]

## 2019-05-14 ENCOUNTER — TELEPHONE (OUTPATIENT)
Dept: FAMILY MEDICINE | Facility: CLINIC | Age: 49
End: 2019-05-14

## 2019-05-14 NOTE — TELEPHONE ENCOUNTER
Please call [facility] to find out what the next step is.  If they don't know, the pt needs to get us the number to call.      Estefany Phelps MS, PA-C

## 2019-05-14 NOTE — TELEPHONE ENCOUNTER
Reason for Call:  Other     Detailed comments: The patient is calling saying he called his insurance and they told him the provider needs to put in a pre-authorization for him to have an MRI. He would like Estefany to do that. He would like a call when it has been done.    Phone Number Patient can be reached at: Home number on file [phone] (home)    Best Time: Anytime    Can we leave a detailed message on this number? YES    Call taken on 5/14/2019 at 9:12 AM by Terrie Almendarez

## 2019-05-14 NOTE — TELEPHONE ENCOUNTER
Called radiology  - they do not do ay pre authorization, it would be either from the business office or somewhere else in [place]. Usually when the pt schedules it, it gets flagged to have this done, and it would be done before the scan was completed.     Called pts number below       The line was busy     Will attempt later     Eve Perez RN, BSN  [facility]

## 2019-05-14 NOTE — TELEPHONE ENCOUNTER
Routing to LP to review and advise.  Who do we need to ruel for this?    Miryam Roberson, BS, RN, N  [facility]) [phone]

## 2019-05-15 NOTE — TELEPHONE ENCOUNTER
Called # below     Attempt two times and both times it went to busy       Eve Perez RN, BSN  [facility]

## 2019-05-16 NOTE — TELEPHONE ENCOUNTER
Patient notified by phone that MRI needs to be scheduled first and then they can start the pre-authorization process.  I advised patient that radiology does not get this approved. The business office gets the pre-approval.  Patient will call to schedule. He verbalized understanding and agreed with plan.      Miryam Roberson, MICHELLE, RN, N  [facility]) [phone]

## 2019-05-22 ENCOUNTER — HOSPITAL ENCOUNTER (OUTPATIENT)
Dept: MRI IMAGING | Facility: CLINIC | Age: 49
Discharge: HOME | End: 2019-05-22
Attending: PHYSICIAN ASSISTANT | Admitting: PHYSICIAN ASSISTANT
Payer: COMMERCIAL

## 2019-05-22 DIAGNOSIS — R42 DIZZINESS: ICD-10-CM

## 2019-05-22 PROCEDURE — 70553 MRI BRAIN STEM W/O & W/DYE: CPT

## 2019-05-22 PROCEDURE — 25500064 ZZH RX 255 OP 636: Performed by: PHYSICIAN ASSISTANT

## 2019-05-22 PROCEDURE — A9585 GADOBUTROL INJECTION: HCPCS | Performed by: PHYSICIAN ASSISTANT

## 2019-05-22 RX ORDER — GADOBUTROL 604.72 MG/ML
7.5 INJECTION INTRAVENOUS ONCE
Status: COMPLETED | OUTPATIENT
Start: 2019-05-22 | End: 2019-05-22

## 2019-05-22 RX ADMIN — GADOBUTROL 7.5 ML: 604.72 INJECTION INTRAVENOUS at 13:48

## 2019-05-23 PROBLEM — Q28.2 AVM (ARTERIOVENOUS MALFORMATION) BRAIN: Status: ACTIVE | Noted: 2019-05-23

## 2019-05-23 NOTE — RESULT ENCOUNTER NOTE
Triage: please call pt via  to discuss results/recommendations:     Lizz  Here are your recent results.  Your brain MRI does show a collection of blood vessels that is prominent and may be contributing to your dizziness.  I would like you to see neurosurgery to discuss further.  Please call [facility]: [facility] [phone]   [email or web address] to schedule.    You have no evidence of any bleeding/masses in the brain, this is GREAT news!    The MRI did show an incidental finding that appears to be consistent with a chronic sinus infection.  This can contribute to dizziness.  I have sent in Augmentin to our pharmacy to see if this helps at all with your symptoms.    If you have any questions please do not hesitate to contact our office via phone ([phone]) or MyChart.    Estefany Phelps, MS, PA-C  [facility]

## 2019-06-04 ENCOUNTER — TELEPHONE (OUTPATIENT)
Dept: NEUROSURGERY | Facility: CLINIC | Age: 49
End: 2019-06-04

## 2019-06-04 NOTE — TELEPHONE ENCOUNTER
REASON FOR CALL: Pt was referred to clinic by Estefany Phelps PA-C for AVM (arteriovenous malformation) brain. He is scheduled to see Dr. Ring on 06/17. Pt is wondering if he could be seen sooner. Pt is also asking if he is able to travel as well.

## 2019-06-04 NOTE — TELEPHONE ENCOUNTER
Called and informed patient that our neurosurgeon Dr. Ring doesn't see patients for brain AVM's. Would refer to a vascular neurosurgeon like Dr. Whitney at the [facility] provided patient with phone number to schedule. I also asked patient to discuss ability to travel with referring PCP. Patient in agreement with plan will cancel appointment with Dr. Ring.

## 2019-06-06 ENCOUNTER — TELEPHONE (OUTPATIENT)
Dept: FAMILY MEDICINE | Facility: CLINIC | Age: 49
End: 2019-06-06

## 2019-06-06 NOTE — TELEPHONE ENCOUNTER
Routing to Estefany Phelps who saw the pt last for further review/recommendations/orders.    Chaparrita Hansen RN  [facility]

## 2019-06-06 NOTE — TELEPHONE ENCOUNTER
I would advise that the pt stay well hydrated and drink at least one electrolyte drink daily (salvador, rasheed) - this should improve his symptoms.      Estefany Phelps MS, PA-C

## 2019-06-06 NOTE — TELEPHONE ENCOUNTER
Called patient @ # below -     Advised of ARNULFO RODRIGUEZ message below - Patient stated an understanding and agreed with plan.    Patient stated he still feels a little bit dizzy after completing the antibiotic. Is a little unsure on his feet when standing.   Patient was seen on 5/7/19 by ARNULFO RODRIGUEZ for this issue and given Rx for Augmentin on 5/23/19.   DENIES: new/worsening symptoms, fevers (just feels a little warm sometimes)    05/22/2019 MRI Brain:   Here are your recent results.  Your brain MRI does show a collection of blood vessels that is prominent and may be contributing to your dizziness.  I would like you to see neurosurgery to discuss further.  Please call [facility]: [facility] - [address] [phone]   [email or web address] to schedule.     You have no evidence of any bleeding/masses in the brain, this is GREAT news!     The MRI did show an incidental finding that appears to be consistent with a chronic sinus infection.  This can contribute to dizziness.  I have sent in Augmentin to our pharmacy to see if this helps at all with your symptoms.      Pharmacy:  [facility]    Patient wondering if he is OK to drive - RN advised patient that if he is still dizzy/unsure on his feet then do NOT drive.     Routing to PCP for further review/recommendations/orders.        Nuris Cintron RN  [facility]

## 2019-06-06 NOTE — TELEPHONE ENCOUNTER
Reason for Call:  Other referral    Detailed comments: Patient calling about the neurosurgery referral. Patient states that his initial appointment with Dr. Ring was canceled because provider does not see pt's for brain AVM's. Another appointment was made for 6/25/19 but patient would like to know if this is something that he needs to be seen sooner for.    Phone Number Patient can be reached at: Cell number on file:    Telephone Information:   Mobile [phone]       Best Time: anytime    Can we leave a detailed message on this number? YES    Call taken on 6/6/2019 at 1:12 PM by Ammon PATTON

## 2019-06-07 NOTE — TELEPHONE ENCOUNTER
Patient notified by phone of LP recommendation below.  Patient verbalized understanding and agreed with plan.      MICHELLE Michaels, RN, N  [facility]) [phone]

## 2019-06-11 ENCOUNTER — DOCUMENTATION ONLY (OUTPATIENT)
Dept: CARE COORDINATION | Facility: CLINIC | Age: 49
End: 2019-06-11

## 2019-06-21 ASSESSMENT — ENCOUNTER SYMPTOMS
POLYDIPSIA: 0
COUGH DISTURBING SLEEP: 0
WHEEZING: 0
DECREASED CONCENTRATION: 0
HEMOPTYSIS: 0
EYE PAIN: 0
NERVOUS/ANXIOUS: 1
HEADACHES: 1
DISTURBANCES IN COORDINATION: 0
TINGLING: 0
WEIGHT LOSS: 0
SPUTUM PRODUCTION: 0
NIGHT SWEATS: 1
DEPRESSION: 0
SHORTNESS OF BREATH: 0
EYE WATERING: 0
CHILLS: 0
DECREASED APPETITE: 0
COUGH: 1
FATIGUE: 0
SPEECH CHANGE: 0
PANIC: 0
LOSS OF CONSCIOUSNESS: 0
PARALYSIS: 0
EYE IRRITATION: 0
WEIGHT GAIN: 0
MEMORY LOSS: 0
POSTURAL DYSPNEA: 0
NUMBNESS: 1
SEIZURES: 0
FEVER: 1
POLYPHAGIA: 0
ALTERED TEMPERATURE REGULATION: 0
INCREASED ENERGY: 0
DYSPNEA ON EXERTION: 0
TREMORS: 0
INSOMNIA: 1
DIZZINESS: 1
HALLUCINATIONS: 0
DOUBLE VISION: 0
EYE REDNESS: 0
SNORES LOUDLY: 1
WEAKNESS: 0

## 2019-06-25 ENCOUNTER — OFFICE VISIT (OUTPATIENT)
Dept: NEUROSURGERY | Facility: CLINIC | Age: 49
End: 2019-06-25
Payer: COMMERCIAL

## 2019-06-25 VITALS
OXYGEN SATURATION: 97 % | HEART RATE: 87 BPM | BODY MASS INDEX: 20.56 KG/M2 | WEIGHT: 111.7 LBS | DIASTOLIC BLOOD PRESSURE: 74 MMHG | HEIGHT: 62 IN | SYSTOLIC BLOOD PRESSURE: 117 MMHG

## 2019-06-25 DIAGNOSIS — Q27.30 AVM (ARTERIOVENOUS MALFORMATION): Primary | ICD-10-CM

## 2019-06-25 ASSESSMENT — PAIN SCALES - GENERAL: PAINLEVEL: NO PAIN (0)

## 2019-06-25 ASSESSMENT — MIFFLIN-ST. JEOR: SCORE: 1237.98

## 2019-06-25 NOTE — PROGRESS NOTES
Dear Ms. Lenin:    We saw Mr. 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 In summary, he is a 48-year-old right-handed male who was seen multiple times in 4/2019 for lightheadedness and constant left-sided tinnitus which resulted in a syncopal episode. One episode in particular occurred when he turned his neck at work and he had a syncopal episode lasting for a few seconds. He was taken to the emergency room but work-up was unremarkable, though no imaging was performed at the time. Subsequent work-up included an MRI on 5/22/2019 which demonstrated a small AVM in the right parietal region. He was also started on Augmentin for a sinus infection based on the MRI which did not resolve his lightheadedness.     Currently, he is doing well and appears pleasant. His left-sided tinnitus has been ongoing for a long time. He denies facial numbness or tingling. He reports occasional onset of headaches on the top of his head when he concentrates. He states he has had these syncopal episodes 6-7 times in the past. These do not have any noticeable warning signs. His daughter denies seeing spells or syncopal episodes. He denies history of known seizures. He is not on anticoagulation/antiplatelet therapy. He will be leaving for [place] on 7/6/2019 for a month.     MEDICAL HISTORY: No significant past medical history.    FAMILY HISTORY: No pertinent family history.    SOCIAL HISTORY: Lives in [place], [place] with his wife and daughter. Non-smoker with mild use of alcohol. No recreational drug use. Works with fiber optics.    PHYSICAL EXAM: On exam, he appears nervous.  Extraocular movements intact. Pupils are equal and reactive. Speech and language are normal. Face is symmetric. Tongue is midline. Hearing is intact bilaterally. He moves all extremities equally and with good coordination. Negative pronator drift. Finger to nose testing is  normal. Lying blood pressure is 117/74 with a pulse of 87 BPM. Sitting blood pressure is 121/81 with a pulse of 106 BPM. Standing blood pressure is 127/86 with a pulse of 102 BPM. His gross neurological examination is normal. NIH stroke scale score is 0.     REVIEW OF STUDIES: We went over his MRI with him and his daughter. This shows a cluster of vessels along the right inferior parietal convexity, consistent with an arteriovenous malformation. The nidus appears compact and there is a prominent venous varix associated with this AVM posteriorly. This appears to be a relatively small AVM. It is most likely supplied exclusively by the middle cerebral artery branches and appears to have superficial drainage.     IMPRESSION AND PLAN: It is not clear if his syncopal episodes are any representation of seizure activity. Otherwise, this AVM appears to be an incidental finding. We believe that further work-up is indicated with a goal of treatment. Given his good life expectancy and the low treatment risk associated with this AVM, we have recommended treatment. We will continue the work-up with a formal cerebral angiogram and we will make further recommendations accordingly. We went over the risks of cerebral angiography including stroke, renal failure, arterial injury, and groin hematoma, and he agrees to proceed. Of note, he will be travelling to [facility] for about a month and he will be back in early August. We will plan on the angiogram when he returns. We have no activity restrictions for him and gave him reassurances that the annual risk of rupture for these lesions is quite low.    I appreciate your confidence in involving us in his care. Please do not hesitate to contact us with questions. We will keep you informed of his progress.     ANNMARIE NERI MD    I, Griselda Kim, am serving as a scribe to document services personally performed by Annmarie Neri MD, based upon my observations and the provider's  statements to me. All documentation has been reviewed by the aforementioned doctor prior to being entered into the official medical record.

## 2019-06-25 NOTE — LETTER
6/25/2019      RE: Lizz Recinos  [address] [address]       Dear Ms. Phelps:    We saw Mr. 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 In summary, he is a 48-year-old right-handed male who was seen multiple times in 4/2019 for lightheadedness and constant left-sided tinnitus which resulted in a syncopal episode. One episode in particular occurred when he turned his neck at work and he had a syncopal episode lasting for a few seconds. He was taken to the emergency room but work-up was unremarkable, though no imaging was performed at the time. Subsequent work-up included an MRI on 5/22/2019 which demonstrated a small AVM in the right parietal region. He was also started on Augmentin for a sinus infection based on the MRI which did not resolve his lightheadedness.     Currently, he is doing well and appears pleasant. His left-sided tinnitus has been ongoing for a long time. He denies facial numbness or tingling. He reports occasional onset of headaches on the top of his head when he concentrates. He states he has had these syncopal episodes 6-7 times in the past. These do not have any noticeable warning signs. His daughter denies seeing spells or syncopal episodes. He denies history of known seizures. He is not on anticoagulation/antiplatelet therapy. He will be leaving for [place] on 7/6/2019 for a month.     MEDICAL HISTORY: No significant past medical history.    FAMILY HISTORY: No pertinent family history.    SOCIAL HISTORY: Lives in [place], [place] with his wife and daughter. Non-smoker with mild use of alcohol. No recreational drug use. Works with fiber optics.    PHYSICAL EXAM: On exam, he appears nervous.  Extraocular movements intact. Pupils are equal and reactive. Speech and language are normal. Face is symmetric. Tongue is midline. Hearing is intact bilaterally. He moves all extremities  equally and with good coordination. Negative pronator drift. Finger to nose testing is normal. Lying blood pressure is 117/74 with a pulse of 87 BPM. Sitting blood pressure is 121/81 with a pulse of 106 BPM. Standing blood pressure is 127/86 with a pulse of 102 BPM. His gross neurological examination is normal. NIH stroke scale score is 0.     REVIEW OF STUDIES: We went over his MRI with him and his daughter. This shows a cluster of vessels along the right inferior parietal convexity, consistent with an arteriovenous malformation. The nidus appears compact and there is a prominent venous varix associated with this AVM posteriorly. This appears to be a relatively small AVM. It is most likely supplied exclusively by the middle cerebral artery branches and appears to have superficial drainage.     IMPRESSION AND PLAN: It is not clear if his syncopal episodes are any representation of seizure activity. Otherwise, this AVM appears to be an incidental finding. We believe that further work-up is indicated with a goal of treatment. Given his good life expectancy and the low treatment risk associated with this AVM, we have recommended treatment. We will continue the work-up with a formal cerebral angiogram and we will make further recommendations accordingly. We went over the risks of cerebral angiography including stroke, renal failure, arterial injury, and groin hematoma, and he agrees to proceed. Of note, he will be travelling to [facility] for about a month and he will be back in early August. We will plan on the angiogram when he returns. We have no activity restrictions for him and gave him reassurances that the annual risk of rupture for these lesions is quite low.    I appreciate your confidence in involving us in his care. Please do not hesitate to contact us with questions. We will keep you informed of his progress.     MD KATHERINE SCHULZ, Griselda Kim, am serving as a scribe to document services  personally performed by Carmen Whitney MD, based upon my observations and the provider's statements to me. All documentation has been reviewed by the aforementioned doctor prior to being entered into the official medical record.

## 2019-06-25 NOTE — LETTER
6/25/2019       RE: Lizz Recinos  [address] [address]     Dear Ms. Phelps:    We saw Mr. 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 In summary, he is a 48-year-old right-handed male who was seen multiple times in 4/2019 for lightheadedness and constant left-sided tinnitus which resulted in a syncopal episode. One episode in particular occurred when he turned his neck at work and he had a syncopal episode lasting for a few seconds. He was taken to the emergency room but work-up was unremarkable, though no imaging was performed at the time. Subsequent work-up included an MRI on 5/22/2019 which demonstrated a small AVM in the right parietal region. He was also started on Augmentin for a sinus infection based on the MRI which did not resolve his lightheadedness.     Currently, he is doing well and appears pleasant. His left-sided tinnitus has been ongoing for a long time. He denies facial numbness or tingling. He reports occasional onset of headaches on the top of his head when he concentrates. He states he has had these syncopal episodes 6-7 times in the past. These do not have any noticeable warning signs. His daughter denies seeing spells or syncopal episodes. He denies history of known seizures. He is not on anticoagulation/antiplatelet therapy. He will be leaving for [place] on 7/6/2019 for a month.     MEDICAL HISTORY: No significant past medical history.    FAMILY HISTORY: No pertinent family history.    SOCIAL HISTORY: Lives in [place], [place] with his wife and daughter. Non-smoker with mild use of alcohol. No recreational drug use. Works with fiber optics.    PHYSICAL EXAM: On exam, he appears nervous.  Extraocular movements intact. Pupils are equal and reactive. Speech and language are normal. Face is symmetric. Tongue is midline. Hearing is intact bilaterally. He moves all extremities  equally and with good coordination. Negative pronator drift. Finger to nose testing is normal. Lying blood pressure is 117/74 with a pulse of 87 BPM. Sitting blood pressure is 121/81 with a pulse of 106 BPM. Standing blood pressure is 127/86 with a pulse of 102 BPM. His gross neurological examination is normal. NIH stroke scale score is 0.     REVIEW OF STUDIES: We went over his MRI with him and his daughter. This shows a cluster of vessels along the right inferior parietal convexity, consistent with an arteriovenous malformation. The nidus appears compact and there is a prominent venous varix associated with this AVM posteriorly. This appears to be a relatively small AVM. It is most likely supplied exclusively by the middle cerebral artery branches and appears to have superficial drainage.     IMPRESSION AND PLAN: It is not clear if his syncopal episodes are any representation of seizure activity. Otherwise, this AVM appears to be an incidental finding. We believe that further work-up is indicated with a goal of treatment. Given his good life expectancy and the low treatment risk associated with this AVM, we have recommended treatment. We will continue the work-up with a formal cerebral angiogram and we will make further recommendations accordingly. We went over the risks of cerebral angiography including stroke, renal failure, arterial injury, and groin hematoma, and he agrees to proceed. Of note, he will be travelling to [facility] for about a month and he will be back in early August. We will plan on the angiogram when he returns. We have no activity restrictions for him and gave him reassurances that the annual risk of rupture for these lesions is quite low.    I appreciate your confidence in involving us in his care. Please do not hesitate to contact us with questions. We will keep you informed of his progress.     MD KATHERINE SCHULZ, Griselda Kim, am serving as a scribe to document services  personally performed by Carmen Whitney MD, based upon my observations and the provider's statements to me. All documentation has been reviewed by the aforementioned doctor prior to being entered into the official medical record.

## 2019-06-25 NOTE — PATIENT INSTRUCTIONS
Scheduling will contact you to make arrangements for your angiogram. You will need a  home and someone that can stay with you through the night. Do not eat for 8 hours prior to your procedure. You may drink clear liquids (includes water, Jell-O, clear broth, apple juice or any non-carbonated beverage that you can see through) until 2 hours prior to your procedure. You may take your medications with a sip of water. You will check in at the [facility] waiting room at the [facility] 1.5 hours prior to your procedure. You will receive written instructions and a map to the hospital after your procedure has been scheduled.      Directions for a Cerebral Angiogram     What to Expect:    You will be scheduled for an angiogram, which is a procedure that uses x-rays to view the arteries (blood vessels that carry blood from the heart out to the body).     After you arrive, the nursing staff will take a short history and assessment before the procedure begins. Your physician will explain the procedure to you and your family, including the possible risks and side effects.  Be sure to ask questions and address any concerns you may have. You will then be asked to sign a consent form for the procedure.     During the procedure a small tube or catheter will be placed into one of your arteries (usually the artery at the top of the leg; less commonly the artery on the inside of your upper arm) and maneuvered to the specific artery being studied.  Contrast medium (x-ray dye) will be injected into the blood vessel and x-rays will be taken of the area.    Once the procedure is completed the catheter will be removed and pressure held at the puncture site for 20 to 30 minutes to make sure the puncture site seals.    During the exam, you are routinely monitored by a heart monitor and an oxygen saturation monitor that lets us know how well you are breathing.  A blood pressure cuff will be on your arm.  An IV is started  to provide you with medications and IV fluids during the procedure.    After the exam you will need to be on complete bed rest for 4 to 6 hours.  The nurse will monitor your vital signs, puncture site, pulses and temperature of the area or leg that was punctured.     After you are discharged do not drive until the next morning and an adult must be with you until then. You may resume your normal activities the day after the procedure.  Do not do any strenuous exercise or lifting for 48 hours after the procedure.       Preparation:   Laboratory tests will be obtained by your doctor on the day of the exam (Basic Metabolic Panel, CBCP, PTT and INR).  Someone will need to drive you to and from the hospital.  Be sure to have someone who can stay with you through the night.   If you are allergic to x-ray contrast or iodine, contact your doctor or the nurse coordinator prior to the exam day for instructions.  If you are or may be pregnant contact your doctor or nurse coordinator prior to the day of the exam.  If you have diabetes, check with your doctor or the nurse coordinator to see if your insulin should be adjusted for the exam.  If you are taking a medication called Glucophage, Glucovance, or Metformin; these medications need to be held the day of the exam and for approximately 48 hours following the procedure.   If you are taking Coumadin (to thin your blood) please contact the nurse coordinator at least 7 days before the exam for special instructions.  You should not have received barium (x-ray contrast) within 48 hours of this procedure.   Do not smoke for 24 hours prior to the procedure.  Do not eat for 8 hours prior to the procedure. You may drink clear liquids (water, ginger ale, etc) until 2 hours prior to the procedure.  You may brush your teeth and take your medications as directed with a sip of water.    If you have questions regarding your procedure, please contact me at [phone], option 3.    If you  need to cancel, reschedule or have procedure scheduling related questions, please call [phone].    Thank you,  Jeremy Montanez RN, CNRN  Stroke & Endovascular Care Coordinator

## 2019-06-26 NOTE — NURSING NOTE
Informed patient and patients daughter: Scheduling will contact you to make arrangements for your angiogram. You will need a  home and someone that can stay with you through the night. Do not eat for 8 hours prior to your procedure. You may drink clear liquids (includes water, Jell-O, clear broth, apple juice or any non-carbonated beverage that you can see through) until 2 hours prior to your procedure. You may take your medications with a sip of water. You will check in at the [facility] waiting room at the [facility] 1.5 hours prior to your procedure. You will receive written instructions and a map to the hospital after your procedure has been scheduled. Patient verbalized understanding. Contact information provided and encouraged to call with questions/concerns.

## 2019-08-05 ENCOUNTER — TELEPHONE (OUTPATIENT)
Dept: NEUROLOGY | Facility: CLINIC | Age: 49
End: 2019-08-05

## 2019-08-05 NOTE — TELEPHONE ENCOUNTER
Procedure Instructions completed and printed with map to Hospital mailed to patients home address listed in demographics.         Tod BERMAN, AJ

## 2019-08-08 NOTE — TELEPHONE ENCOUNTER
[facility] Call Center    Phone Message    May a detailed message be left on voicemail: yes    Reason for Call: Other: Pt returning phone call please call back      Action Taken: Message routed to:  [facility] ([facility] neuro

## 2019-08-09 NOTE — TELEPHONE ENCOUNTER
Informed patient of procedure instructions. Confirmed date and time. Patient verbalized understanding. Answered questions to patients satisfaction. Patient has my contact information and was encouraged to call with questions/concerns.

## 2019-08-26 NOTE — TELEPHONE ENCOUNTER
Rescheduled angio from 8/23/19 to 9/12/19. Patient aware. Note and patient instructions addended to reflect change. Updated patient instructions mailed to patient.

## 2019-08-26 NOTE — PATIENT INSTRUCTIONS
You are scheduled for a Diagnostic Cerebral Angiogram on 9/12/19 at 12:30 PM.     Please follow these instructions:    * You should arrive at the [facility] waiting [facility] at the [facility] at 11:00 Am. The address is [address] [place] [address]. The phone number is [phone]. A map is enclosed.    * Do not eat after 4:30 AM; you may drink clear liquids (includes water, Jell-O, clear broth, apple juice or any non-carbonated beverage that you can see through) until 10:30 AM.     * You may take your medications with a sip of water the morning of the procedure.     * You will be discharged the same day. You must have a  home and someone that can stay with you through the night.     If you have questions regarding your procedure, please contact me at [phone], option [phone].    If you need to cancel, reschedule or have procedure scheduling related questions, please call [phone].    Thank you,  Jeremy Montanez, RN, CNRN  Stroke & Endovascular Care Coordinator

## 2019-09-12 ENCOUNTER — APPOINTMENT (OUTPATIENT)
Dept: MEDSURG UNIT | Facility: CLINIC | Age: 49
End: 2019-09-12
Attending: NEUROLOGICAL SURGERY
Payer: COMMERCIAL

## 2019-09-12 ENCOUNTER — APPOINTMENT (OUTPATIENT)
Dept: INTERVENTIONAL RADIOLOGY/VASCULAR | Facility: CLINIC | Age: 49
End: 2019-09-12
Attending: NEUROLOGICAL SURGERY
Payer: COMMERCIAL

## 2019-09-12 ENCOUNTER — HOSPITAL ENCOUNTER (OUTPATIENT)
Facility: CLINIC | Age: 49
Discharge: HOME | End: 2019-09-12
Attending: NEUROLOGICAL SURGERY | Admitting: NEUROLOGICAL SURGERY
Payer: COMMERCIAL

## 2019-09-12 VITALS
OXYGEN SATURATION: 98 % | HEART RATE: 82 BPM | DIASTOLIC BLOOD PRESSURE: 81 MMHG | TEMPERATURE: 98.5 F | RESPIRATION RATE: 18 BRPM | SYSTOLIC BLOOD PRESSURE: 105 MMHG

## 2019-09-12 DIAGNOSIS — Q27.30 AVM (ARTERIOVENOUS MALFORMATION): ICD-10-CM

## 2019-09-12 LAB
APTT PPP: 30 SEC (ref 22–37)
CREAT SERPL-MCNC: 0.71 MG/DL (ref 0.66–1.25)
ERYTHROCYTE [DISTWIDTH] IN BLOOD BY AUTOMATED COUNT: 13 % (ref 10–15)
GFR SERPL CREATININE-BSD FRML MDRD: >90 ML/MIN/{1.73_M2}
HCT VFR BLD AUTO: 47 % (ref 40–53)
HGB BLD-MCNC: 15 G/DL (ref 13.3–17.7)
INR PPP: 0.97 (ref 0.86–1.14)
MCH RBC QN AUTO: 29.4 PG (ref 26.5–33)
MCHC RBC AUTO-ENTMCNC: 31.9 G/DL (ref 31.5–36.5)
MCV RBC AUTO: 92 FL (ref 78–100)
PLATELET # BLD AUTO: 204 10E9/L (ref 150–450)
RBC # BLD AUTO: 5.11 10E12/L (ref 4.4–5.9)
WBC # BLD AUTO: 5.1 10E9/L (ref 4–11)

## 2019-09-12 PROCEDURE — 27210908 ZZH NEEDLE CR4

## 2019-09-12 PROCEDURE — 85730 THROMBOPLASTIN TIME PARTIAL: CPT | Performed by: STUDENT IN AN ORGANIZED HEALTH CARE EDUCATION/TRAINING PROGRAM

## 2019-09-12 PROCEDURE — 25800030 ZZH RX IP 258 OP 636: Performed by: STUDENT IN AN ORGANIZED HEALTH CARE EDUCATION/TRAINING PROGRAM

## 2019-09-12 PROCEDURE — 27210732 ZZH ACCESSORY CR1

## 2019-09-12 PROCEDURE — 36227 PLACE CATH XTRNL CAROTID: CPT

## 2019-09-12 PROCEDURE — 36224 PLACE CATH CAROTD ART: CPT | Mod: 50

## 2019-09-12 PROCEDURE — 25500064 ZZH RX 255 OP 636: Performed by: NEUROLOGICAL SURGERY

## 2019-09-12 PROCEDURE — 85610 PROTHROMBIN TIME: CPT | Performed by: STUDENT IN AN ORGANIZED HEALTH CARE EDUCATION/TRAINING PROGRAM

## 2019-09-12 PROCEDURE — 27210889 ZZH ACCESSORY CR8

## 2019-09-12 PROCEDURE — 85027 COMPLETE CBC AUTOMATED: CPT | Performed by: STUDENT IN AN ORGANIZED HEALTH CARE EDUCATION/TRAINING PROGRAM

## 2019-09-12 PROCEDURE — 40000168 ZZH STATISTIC PP CARE STAGE 3

## 2019-09-12 PROCEDURE — 25000128 H RX IP 250 OP 636: Performed by: STUDENT IN AN ORGANIZED HEALTH CARE EDUCATION/TRAINING PROGRAM

## 2019-09-12 PROCEDURE — 99153 MOD SED SAME PHYS/QHP EA: CPT

## 2019-09-12 PROCEDURE — 25000125 ZZHC RX 250: Performed by: STUDENT IN AN ORGANIZED HEALTH CARE EDUCATION/TRAINING PROGRAM

## 2019-09-12 PROCEDURE — 27210804 ZZH SHEATH CR3

## 2019-09-12 PROCEDURE — 36226 PLACE CATH VERTEBRAL ART: CPT

## 2019-09-12 PROCEDURE — 99152 MOD SED SAME PHYS/QHP 5/>YRS: CPT

## 2019-09-12 PROCEDURE — C1769 GUIDE WIRE: HCPCS

## 2019-09-12 PROCEDURE — C1887 CATHETER, GUIDING: HCPCS

## 2019-09-12 PROCEDURE — 82565 ASSAY OF CREATININE: CPT | Performed by: STUDENT IN AN ORGANIZED HEALTH CARE EDUCATION/TRAINING PROGRAM

## 2019-09-12 RX ORDER — PROCHLORPERAZINE 25 MG
25 SUPPOSITORY, RECTAL RECTAL EVERY 12 HOURS PRN
Status: DISCONTINUED | OUTPATIENT
Start: 2019-09-12 | End: 2019-09-12 | Stop reason: HOSPADM

## 2019-09-12 RX ORDER — FLUMAZENIL 0.1 MG/ML
0.2 INJECTION, SOLUTION INTRAVENOUS
Status: DISCONTINUED | OUTPATIENT
Start: 2019-09-12 | End: 2019-09-12 | Stop reason: HOSPADM

## 2019-09-12 RX ORDER — ACETAMINOPHEN 325 MG/1
325 TABLET ORAL EVERY 4 HOURS PRN
Status: DISCONTINUED | OUTPATIENT
Start: 2019-09-12 | End: 2019-09-12 | Stop reason: HOSPADM

## 2019-09-12 RX ORDER — METOCLOPRAMIDE 10 MG/1
10 TABLET ORAL EVERY 6 HOURS PRN
Status: DISCONTINUED | OUTPATIENT
Start: 2019-09-12 | End: 2019-09-12 | Stop reason: HOSPADM

## 2019-09-12 RX ORDER — NALOXONE HYDROCHLORIDE 0.4 MG/ML
.1-.4 INJECTION, SOLUTION INTRAMUSCULAR; INTRAVENOUS; SUBCUTANEOUS
Status: DISCONTINUED | OUTPATIENT
Start: 2019-09-12 | End: 2019-09-12 | Stop reason: HOSPADM

## 2019-09-12 RX ORDER — ONDANSETRON 2 MG/ML
4 INJECTION INTRAMUSCULAR; INTRAVENOUS EVERY 6 HOURS PRN
Status: DISCONTINUED | OUTPATIENT
Start: 2019-09-12 | End: 2019-09-12 | Stop reason: HOSPADM

## 2019-09-12 RX ORDER — NICOTINE POLACRILEX 4 MG
15-30 LOZENGE BUCCAL
Status: DISCONTINUED | OUTPATIENT
Start: 2019-09-12 | End: 2019-09-12 | Stop reason: HOSPADM

## 2019-09-12 RX ORDER — IBUPROFEN 200 MG
200 TABLET ORAL EVERY 6 HOURS PRN
Status: DISCONTINUED | OUTPATIENT
Start: 2019-09-12 | End: 2019-09-12 | Stop reason: HOSPADM

## 2019-09-12 RX ORDER — IBUPROFEN 200 MG
400 TABLET ORAL EVERY 6 HOURS PRN
Status: DISCONTINUED | OUTPATIENT
Start: 2019-09-12 | End: 2019-09-12 | Stop reason: HOSPADM

## 2019-09-12 RX ORDER — FENTANYL CITRATE 50 UG/ML
25-50 INJECTION, SOLUTION INTRAMUSCULAR; INTRAVENOUS EVERY 5 MIN PRN
Status: DISCONTINUED | OUTPATIENT
Start: 2019-09-12 | End: 2019-09-12 | Stop reason: HOSPADM

## 2019-09-12 RX ORDER — DEXTROSE MONOHYDRATE 25 G/50ML
25-50 INJECTION, SOLUTION INTRAVENOUS
Status: DISCONTINUED | OUTPATIENT
Start: 2019-09-12 | End: 2019-09-12 | Stop reason: HOSPADM

## 2019-09-12 RX ORDER — SODIUM CHLORIDE 9 MG/ML
INJECTION, SOLUTION INTRAVENOUS CONTINUOUS
Status: DISCONTINUED | OUTPATIENT
Start: 2019-09-12 | End: 2019-09-12 | Stop reason: HOSPADM

## 2019-09-12 RX ORDER — METOCLOPRAMIDE HYDROCHLORIDE 5 MG/ML
10 INJECTION INTRAMUSCULAR; INTRAVENOUS EVERY 6 HOURS PRN
Status: DISCONTINUED | OUTPATIENT
Start: 2019-09-12 | End: 2019-09-12 | Stop reason: HOSPADM

## 2019-09-12 RX ORDER — ONDANSETRON 4 MG/1
4 TABLET, ORALLY DISINTEGRATING ORAL EVERY 6 HOURS PRN
Status: DISCONTINUED | OUTPATIENT
Start: 2019-09-12 | End: 2019-09-12 | Stop reason: HOSPADM

## 2019-09-12 RX ORDER — LIDOCAINE 40 MG/G
CREAM TOPICAL
Status: DISCONTINUED | OUTPATIENT
Start: 2019-09-12 | End: 2019-09-12 | Stop reason: HOSPADM

## 2019-09-12 RX ORDER — IODIXANOL 320 MG/ML
150 INJECTION, SOLUTION INTRAVASCULAR ONCE
Status: COMPLETED | OUTPATIENT
Start: 2019-09-12 | End: 2019-09-12

## 2019-09-12 RX ORDER — PROCHLORPERAZINE MALEATE 10 MG
10 TABLET ORAL EVERY 6 HOURS PRN
Status: DISCONTINUED | OUTPATIENT
Start: 2019-09-12 | End: 2019-09-12 | Stop reason: HOSPADM

## 2019-09-12 RX ADMIN — MIDAZOLAM HYDROCHLORIDE 2 MG: 2 INJECTION, SOLUTION INTRAMUSCULAR; INTRAVENOUS at 16:42

## 2019-09-12 RX ADMIN — LIDOCAINE HYDROCHLORIDE 15 ML: 10 INJECTION, SOLUTION EPIDURAL; INFILTRATION; INTRACAUDAL; PERINEURAL at 16:44

## 2019-09-12 RX ADMIN — SODIUM CHLORIDE: 9 INJECTION, SOLUTION INTRAVENOUS at 12:40

## 2019-09-12 RX ADMIN — IODIXANOL 55 ML: 320 INJECTION, SOLUTION INTRAVASCULAR at 16:51

## 2019-09-12 RX ADMIN — FENTANYL CITRATE 100 MCG: 50 INJECTION INTRAMUSCULAR; INTRAVENOUS at 16:42

## 2019-09-12 ASSESSMENT — VISUAL ACUITY
OU: GLASSES

## 2019-09-12 NOTE — IP AVS SNAPSHOT
[address] [address]                                    After Visit Summary   9/12/2019    Lizz Recinos    MRN: [id number]           After Visit Summary Signature Page    I have received my discharge instructions, and my questions have been answered. I have discussed any challenges I see with this plan with the nurse or doctor.    ..........................................................................................................................................  Patient/Patient Representative Signature      ..........................................................................................................................................  Patient Representative Print Name and Relationship to Patient    ..................................................               ................................................  Date                                   Time    ..........................................................................................................................................  Reviewed by Signature/Title    ...................................................              ..............................................  Date                                               Time          22EPIC Rev 08/18

## 2019-09-12 NOTE — PROGRESS NOTES
[facility] [place]     Endovascular Surgical Neuroradiology Pre-Procedure Note      HPI:  Lizz Recinos is a 48 year old man with history of lightheadedness and left-sided tinnitus which resulted in a syncopal episode.  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  He is currently doing well and does not have any active neurological symptoms.  We will perform a diagnostic cerebral angiogram for further evaluation.    Medical History:  Past Medical History:   Diagnosis Date     CARDIOVASCULAR SCREENING; LDL GOAL LESS THAN 160        Surgical History:  Past Surgical History:   Procedure Laterality Date     NO HISTORY OF SURGERY         Family History:  Family History   Problem Relation Age of Onset     No Known Problems Brother      No Known Problems Brother      No Known Problems Brother      No Known Problems Brother      No Known Problems Sister      No Known Problems Sister      No Known Problems Sister      No Known Problems Daughter      No Known Problems Son      No Known Problems Son      Colon Cancer No family hx of      Prostate Cancer No family hx of      Diabetes No family hx of      Coronary Artery Disease No family hx of      Cerebrovascular Disease No family hx of      Hypertension No family hx of      Breast Cancer No family hx of        Social History:  Social History     Socioeconomic History     Marital status:      Spouse name: Linda     Number of children: 3     Years of education: 14     Highest education level: Not on file   Occupational History     Occupation: Technician - fiber optic     Employer: [employer]   Social Needs     Financial resource strain: Not on file     Food insecurity:     Worry: Not on file     Inability: Not on file     Transportation needs:     Medical: Not on file     Non-medical: Not on file   Tobacco Use     Smoking status: Never Smoker      Smokeless tobacco: Never Used   Substance and Sexual Activity     Alcohol use: Yes     Alcohol/week: 1.0 oz     Types: 2 Standard drinks or equivalent per week     Drug use: No     Sexual activity: Yes     Partners: Female     Birth control/protection: Condom   Lifestyle     Physical activity:     Days per week: Not on file     Minutes per session: Not on file     Stress: Not on file   Relationships     Social connections:     Talks on phone: Not on file     Gets together: Not on file     Attends Jain service: Not on file     Active member of club or organization: Not on file     Attends meetings of clubs or organizations: Not on file     Relationship status: Not on file     Intimate partner violence:     Fear of current or ex partner: Not on file     Emotionally abused: Not on file     Physically abused: Not on file     Forced sexual activity: Not on file   Other Topics Concern     Parent/sibling w/ CABG, MI or angioplasty before 65F 55M? No      Service Not Asked     Blood Transfusions Not Asked     Caffeine Concern Yes     Comment: 2 cups daily     Occupational Exposure Not Asked     Hobby Hazards Not Asked     Sleep Concern Not Asked     Stress Concern Not Asked     Weight Concern Not Asked     Special Diet Not Asked     Back Care Not Asked     Exercise Yes     Comment: active     Bike Helmet Not Asked     Seat Belt Yes     Self-Exams Not Asked   Social History Narrative     Not on file       Allergies:  No Known Allergies    Is there a contrast allergy?  No    Medications:  No current facility-administered medications for this encounter.    .    ROS:  The 5 point Review of Systems is negative other than noted in the HPI or here.     PHYSICAL EXAMINATION  Vital Signs:  B/P: 129/101,  T: 98.5,  P: Data Unavailable,  R: Data Unavailable    Cardio:  RRR  Pulmonary:  no respiratory distress  Abdomen:  soft    Neurologic  Mental Status:  fully alert, attentive and oriented, follows commands, speech clear  and fluent  Cranial Nerves:  visual fields intact, PERRL, EOMI with normal smooth pursuit, facial sensation intact and symmetric, facial movements symmetric, hearing not formally tested but intact to conversation, palate elevation symmetric and uvula midline, no dysarthria, shoulder shrug strong bilaterally, tongue protrusion midline  Motor:  no abnormal movements, normal tone throughout, normal muscle bulk, no pronator drift, normal and symmetric rapid finger tapping, able to move all limbs spontaneously, strength 5/5 throughout upper and lower extremities  Sensory:  intact/symmetric to light touch and pin prick throughout upper and lower extremities  Coordination:  FNF and HS intact without dysmetria    Pre-procedure National Institutes of Health Stroke Scale:   Not applicable    LABS  (most recent Cr, BUN, GFR, PLT, INR, PTT within the past 7 days):  No lab results found in last 7 days.     Platelet Function P2Y12 (PRU):  Not applicable      ASSESSMENT:  Mr Zamudio is doing well.  He is accompanied by his wife today and he translates for her to take consent for the procedure from him.  He does not seem to have any active neurological planes.      PLAN: Diagnostic cerebral angiogram for further evaluation of right parietal region arteriovenous malformation.        PRE-PROCEDURE SEDATION ASSESSMENT     Pre-Procedure Sedation Assessment done at 1130.    Expected Level:  Moderate Sedation    Indication:  Sedation is required to allow for neurointerventional procedure.    Consent obtained from patient after discussing the risks, benefits and alternatives.     PO Intake:  Appropriately NPO for procedure    ASA Class:  Class 1 - HEALTHY PATIENT    Mallampati:  Grade 3:  Soft palate visible, posterior pharyngeal wall not visible    History and physical reviewed and no updates needed. I have reviewed the lab findings, diagnostic data, medications, and the plan for sedation. I have determined this patient to be an  appropriate candidate for the planned sedation and procedure and have reassessed the patient IMMEDIATELY PRIOR to sedation and procedure.    Patient was discussed with the Attending, Dr. Whitney, who agrees with the plan.    Sanford Teague MD   Pager: [phone].      I have reviewed the history. I have seen and examined the patient myself and agree with the assessment and plan above.  EMANUEL Whitney MD

## 2019-09-12 NOTE — DISCHARGE INSTRUCTIONS
[facility]         Interventional Radiology  Discharge Instructions Post Angiogram (NEURO)    AFTER YOU GO HOME  ? DO NOT drive or operate machinery at home or at work for at least 24 hours  ? If you were given sedation; we recommend that an adult stay with you for the first 24 hours  ? DO relax and take it easy for 24 hours  ? DO drink plenty of fluids  ? DO resume your regular diet, unless otherwise instructed by your Primary Physician  ? DO NOT SMOKE FOR AT LEAST 24 HOURS, if you have been given any medications that were to help you relax or sedate you during your procedure  ? DO NOT drink alcoholic beverages the day of your procedure  ? DO NOT do any strenuous exercise or lifting for at least 2 days following your procedure  ? DO NOT take a bath or shower for at least 12 hours following your procedure  ? DO NOT scrub the procedure site vigorously for 5 days  ? DO NOT make any important or legal decisions for 24 hours following your procedure if you were given sedation    CALL THE PHYSICIAN IF:  ? You start bleeding from the procedure site.  If you do start to bleed from that site, lie down flat and hold pressure on the site.  Your physician will tell you if you need to return to the hospital.  It is common to have a small bruise or lump at the site  ? You have numbness, coolness or change in color of the right leg/same as the puncture site  ? You experience changes in your vision, hearing, balance, coordination, speech, thinking or memory  ? You experience weakness in one or more extremity  ? You experience pain or redness at the puncture site  ? You develop nausea or vomiting  ? You develop hives or a rash or unexplained itching  ? You develop a temperature of 101 degrees F or greater    ADDITIONAL INSTRUCTIONS  ? Support the puncture site for coughing, sneezing, or moving your bowels for the first 48 hours  ? No tub bath, hot tubs, or swimming for 5 days  ? No lotion or powder to the  puncture site for 3 days  ? Star Closure device pamphlet to pt.    [facility] INTERVENTIONAL RADIOLOGY DEPARTMENT  Procedure Physician:    Chidi Whitney and Ho,   Date of procedure:   September 12, 2019  Telephone Numbers: [phone] Monday-Friday 8:00 am to 4:30 pm  [phone] After 4:30 pm Monday-Friday, Weekends & Holidays.   Ask for the Neuro-Radiologist on call.  Someone is on call 24 hrs/day  [facility] toll free number: [phone] Monday-Friday 8:00 am to 4:30 pm  [facility] Emergency Dept: [phone]

## 2019-09-12 NOTE — PROGRESS NOTES
Pt here post cerebral angiogram; pt awake and alert, neuros intact. R groin is flat, firm, intact; good pulse and cms intact. Family at bedside. Will continue to monitor.

## 2019-09-12 NOTE — PROGRESS NOTES
Patient Name: Lizz Recinos  Medical Record Number: [id number]  Today's Date: 9/12/2019    Procedure: Diagnostic cerebral angiogram.  Proceduralist: MD Chelo., MD Ho.    Sedation start time: 1600  Sedation end time: 1640  Sedation medications administered: Fentanyl:100 mcg Versed:2mg    Procedure start time: 1600  Puncture time: 1600  Procedure end time: 1645    Report given to: FREDERICK Barbosa    Other Notes: Pt arrived to IR room 3 from . Consent reviewed. Pt denies any questions or concerns regarding procedure. Pt positioned supine and monitored per protocol. Starclose closure device deployed and failed. 4 hour flat bedrest. Pt tolerated procedure without any noted complications. Pt transferred back to .    Eve Blanco RN

## 2019-09-12 NOTE — IP AVS SNAPSHOT
MRN:[id number]                      After Visit Summary   9/12/2019    Lizz Recinos    MRN: [id number]           Visit Information        Department      9/12/2019 10:45 AM [address] [facility] [place]          Review of your medicines      UNREVIEWED medicines. Ask your doctor about these medicines       Dose / Directions   amoxicillin-clavulanate 875-125 MG tablet  Commonly known as:  AUGMENTIN  Used for:  Chronic pansinusitis  Ask about: Should I take this medication?      Dose:  1 tablet  Take 1 tablet by mouth 2 times daily for 10 days  Quantity:  20 tablet  Refills:  0     MULTIVITAMINS PO      Take  by mouth daily.  Refills:  0     UNABLE TO FIND      Ginko biloba with vinpocetine  Refills:  0     UNABLE TO FIND      Chinese medicine herb  Refills:  0              Protect others around you: Learn how to safely use, store and throw away your medicines at www.disposemymeds.org.       Follow-ups after your visit       Care Instructions       Further instructions from your care team       [facility]         Interventional Radiology  Discharge Instructions Post Angiogram (NEURO)    AFTER YOU GO HOME  ? DO NOT drive or operate machinery at home or at work for at least 24 hours  ? If you were given sedation; we recommend that an adult stay with you for the first 24 hours  ? DO relax and take it easy for 24 hours  ? DO drink plenty of fluids  ? DO resume your regular diet, unless otherwise instructed by your Primary Physician  ? DO NOT SMOKE FOR AT LEAST 24 HOURS, if you have been given any medications that were to help you relax or sedate you during your procedure  ? DO NOT drink alcoholic beverages the day of your procedure  ? DO NOT do any strenuous exercise or lifting for at least 2 days following your procedure  ? DO NOT take a bath or shower for at least 12 hours following your procedure  ? DO NOT scrub the procedure site vigorously for 5 days  ? DO NOT make any important or  legal decisions for 24 hours following your procedure if you were given sedation    CALL THE PHYSICIAN IF:  ? You start bleeding from the procedure site.  If you do start to bleed from that site, lie down flat and hold pressure on the site.  Your physician will tell you if you need to return to the hospital.  It is common to have a small bruise or lump at the site  ? You have numbness, coolness or change in color of the right leg/same as the puncture site  ? You experience changes in your vision, hearing, balance, coordination, speech, thinking or memory  ? You experience weakness in one or more extremity  ? You experience pain or redness at the puncture site  ? You develop nausea or vomiting  ? You develop hives or a rash or unexplained itching  ? You develop a temperature of 101 degrees F or greater    ADDITIONAL INSTRUCTIONS  ? Support the puncture site for coughing, sneezing, or moving your bowels for the first 48 hours  ? No tub bath, hot tubs, or swimming for 5 days  ? No lotion or powder to the puncture site for 3 days  ? Star Closure device pamphlet to pt.    [facility] INTERVENTIONAL RADIOLOGY DEPARTMENT  Procedure Physician:    Chidi Whitney and Ho,   Date of procedure:   September 12, 2019  Telephone Numbers: [phone] Monday-Friday 8:00 am to 4:30 pm  [phone] After 4:30 pm Monday-Friday, Weekends & Holidays.   Ask for the Neuro-Radiologist on call.  Someone is on call 24 hrs/day  [facility] toll free number: [phone] Monday-Friday 8:00 am to 4:30 pm  [facility] Emergency Dept: [phone]        Additional Information About Your Visit       [employer] Information    [employer] gives you secure access to your electronic health record. If you see a primary care provider, you can also send messages to your care team and make appointments. If you have questions, please call your primary care clinic.  If you do not have a primary care provider, please call [phone] and they will assist you.       Care EveryWhere  ID    This is your Care EveryWhere ID. This could be used by other organizations to access your [facility] medical records  [id number]       Your Vitals Were     Blood Pressure   108/71    Pulse   82    Temperature   98.5  F (36.9  C) (Oral)    Respirations   20    Pulse Oximetry   98%          Primary Care Provider Office Phone # Fax #    Lauri Beltrán [phone]      Equal Access to Services    [facility] : Hadii aad ku hadasho Soomaali, waaxda luqadaha, qaybta kaalmada adeegyada, waxay radhain annien aderoxy bach lasunnytemo . So [facility] [phone].    ATENCIÓN: Si habla español, tiene a vallejo disposición servicios gratuitos de asistencia lingüística. Llame al [phone].    We comply with applicable federal civil rights laws and [place] laws. We do not discriminate on the basis of race, color, national origin, age, disability, sex, sexual orientation, or gender identity.           Thank you!    Thank you for choosing [facility] for your care. Our goal is always to provide you with excellent care. Hearing back from our patients is one way we can continue to improve our services. Please take a few minutes to complete the written survey that you may receive in the mail after you visit with us. Thank you!            Medication List      ASK your doctor about these medications          Morning Afternoon Evening Bedtime As Needed    amoxicillin-clavulanate 875-125 MG tablet  Also known as:  AUGMENTIN  INSTRUCTIONS:  Take 1 tablet by mouth 2 times daily for 10 days  Ask about: Should I take this medication?                     MULTIVITAMINS PO  INSTRUCTIONS:  Take  by mouth daily.                     UNABLE TO FIND  INSTRUCTIONS:  Ginko biloba with vinpocetine                     UNABLE TO FIND  INSTRUCTIONS:  Chinese medicine herb

## 2019-09-12 NOTE — PROCEDURES
[facility] [place]     Endovascular Surgical Neuroradiology Post-Procedure Note    Pre-Procedure Diagnosis: Right parietal AVM  Post-Procedure Diagnosis:  Right parietal AVM    Procedure(s):   Diagnostic cervicocerebral angiography    Findings:  Right parietal AVM  Being supplied by the distal branches of right middle cerebral artery. Please see dictated report for further details.    Plan:  Follow up in clinic to discuss management.    Primary Surgeon:  Dr. Carmen Whitney  Secondary Surgeon:  Not applicable  Secondary Surgeon Review:  None  Fellow:  Emeterio Teague  Additional Assistants:    Prior to the start of the procedure and with procedural staff participation, I verbally confirmed: the patient s identity using two indicators, relevant allergies, that the procedure was appropriate and matched the consent or emergent situation, and that the correct equipment/implants were available. Immediately prior to starting the procedure I conducted the Time Out with the procedural staff and re-confirmed the patient s name, procedure, and site/side. (The Joint Commission universal protocol was followed.)  Yes    PRU value: Not applicable    Anesthesia:  Conscious Sedation  Medications:  Fentanyl, Lidocaine, Midazolam  Puncture site:  Right Femoral Artery    Fluoroscopy time (minutes):  19.7   Radiation dose (mGy):  1042    Contrast amount (mL):  55     Estimated blood loss (mL):  Minimal    Closure:  Device Starclose    Disposition:  Home after recovery.        Sedation Post-Procedure Summary    Sedatives: Fentanyl and Midazolam (Versed) 100 mcg  and 2 mg    Vital signs and pulse oximetry were monitored and remained stable throughout the procedure, and sedation was maintained until the procedure was complete.  The patient was monitored by staff until sedation discharge criteria were met.    Patient tolerance:  Patient tolerated the procedure well with no immediate complications.    Time of  sedation in minutes:  45 minutes from beginning to end of physician one to one monitoring.    Sanford Teague MD  Pager:  [phone]    See official report in PACS  EMANUEL Whitney MD

## 2019-09-13 ENCOUNTER — PATIENT OUTREACH (OUTPATIENT)
Dept: NEUROLOGY | Facility: CLINIC | Age: 49
End: 2019-09-13

## 2019-09-13 NOTE — PROGRESS NOTES
Pt completed bedrest, up walking, steady on his feet; voided; right groin site remains dry and intact. Pt tolerated food and drink. IV discontinued, catheter intact.  Dr Teague agreed  to see pt post procedure; pt reports he did talk to MD. Discharge instructions reviewed with copy to pt. Stated understanding. 2030--discharged to lobby per wheelchair.

## 2019-09-13 NOTE — PROGRESS NOTES
[facility]: Post-Discharge Note  SITUATION                                                      Admission:    Admission Date: 09/12/19   Reason for Admission: Diagnostic cervicocerebral angiography  Discharge:    Discharge Date: 09/12/19   Discharge Diagnosis: Right parietal AVM   Discharge Service: Endovascular Surgical Neuroradiology  Findings:    Right parietal AVM  Being supplied by the distal branches of right middle cerebral artery. Please see dictated report for further details.     BACKGROUND                                                      HPI:  Lizz Recinos is a 48 year old male with history of lightheadedness and left-sided tinnitus which resulted in a syncopal episode.  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  He is currently doing well and does not have any active neurological symptoms.  We will perform a diagnostic cerebral angiogram for further evaluation.    ASSESSMENT      Discharge Assessment  Patient reports symptoms are: Improved  Does the patient have all of their medications?: Not Applicable  Does patient know what their new medications are for?: Not applicable  Does patient have a follow-up appointment scheduled?: No  Does patient have any other questions or concerns?: No    Post-op  Did the patient have surgery or a procedure: Yes  Incision: healing  Drainage: No  Bleeding: none  Fever: No  Chills: No  Redness: No  Warmth: No  Swelling: No  Incision site pain: No  Closure: other(Starclose right femoral artery)  Eating & Drinking: eating and drinking without complaints/concerns  Bowel Function: normal  Urinary Status: voiding without complaint/concerns      PLAN                                                      Outpatient Plan:  Follow up in clinic to discuss management. Message sent to scheduling to contact patient to make appointment.     Patient is aware and in agreement with the  plan. Patient has my contact information and was encouraged to call with questions/concerns.       No future appointments.      Lesa Montanez RN

## 2019-09-20 ENCOUNTER — OFFICE VISIT (OUTPATIENT)
Dept: NEUROSURGERY | Facility: CLINIC | Age: 49
End: 2019-09-20
Payer: COMMERCIAL

## 2019-09-20 VITALS
HEART RATE: 76 BPM | WEIGHT: 114 LBS | DIASTOLIC BLOOD PRESSURE: 76 MMHG | HEIGHT: 62 IN | BODY MASS INDEX: 20.98 KG/M2 | OXYGEN SATURATION: 97 % | RESPIRATION RATE: 15 BRPM | SYSTOLIC BLOOD PRESSURE: 114 MMHG

## 2019-09-20 DIAGNOSIS — Q28.3 CONGENITAL ANOMALY OF CEREBROVASCULAR SYSTEM: Primary | ICD-10-CM

## 2019-09-20 ASSESSMENT — MIFFLIN-ST. JEOR: SCORE: 1248.41

## 2019-09-20 ASSESSMENT — PAIN SCALES - GENERAL: PAINLEVEL: MILD PAIN (2)

## 2019-09-20 NOTE — LETTER
RE: Lizz Recinos  [address] [facility] [address]       Service Date: 2019      Lauri Beltrán MD   [facility]   [address] [place]  [address]      RE: Lizz Recinos   MRN: [id number]   : 1970      Dear Dr. Beltrán:      We saw Mr. Recinos in [facility] today for followup of his recent cerebral angiogram.  We performed the angiogram last week to evaluate his intracranial AVM.  He tolerated the procedure well.  He has some mild discomfort at the right groin puncture site.      We went over his angiogram and MRI with him and his wife.  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  It has numerous superficial cortical draining veins.  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      This 30-minute visit was spent discussing management of this grade 1 AVM.  We believe that this AVM can be treated safely with either surgical resection or radiosurgery.  Given his young age and the feasibility of treatment, we recommend treatment over observation.  Because of its low grade, we recommended surgical resection as the best option with radiosurgery also being a reasonable choice.  We went over the advantages and disadvantages of each.  He understands we are ranking surgical resection first followed by radiosurgery second.  He will review these options and we will contact him in the next 2 weeks.  We do not have any activity restrictions for him.      Please do not hesitate to contact us with questions.      Carmen Whitney MD

## 2019-09-20 NOTE — PATIENT INSTRUCTIONS
We will contact you in a few weeks to see how you would like to proceed with treatment of your AVM.    If you have any questions please contact me at [phone], option 3.    Jeremy Montanez RN, CNRN  Stroke & Endovascular Care Coordinator    Thank you for choosing [facility] for your health care needs.

## 2019-09-20 NOTE — NURSING NOTE
Chief Complaint   Patient presents with     RECHECK     [facility] RETURN DISCUSS AVM TREATMENT        Carlos Ly, EMT

## 2019-09-20 NOTE — LETTER
RE: Lizz Recinos  [address] [facility] [address]       Service Date: 2019      Lauri Beltrán MD   [facility]   [address] [place]  [address]      RE: Lizz Recinos   MRN: [id number]   : 1970      Dear Dr. Beltrán:      We saw Mr. Recinos in [facility] today for followup of his recent cerebral angiogram.  We performed the angiogram last week to evaluate his intracranial AVM.  He tolerated the procedure well.  He has some mild discomfort at the right groin puncture site.      We went over his angiogram and MRI with him and his wife.  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  It has numerous superficial cortical draining veins.  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      This 30-minute visit was spent discussing management of this grade 1 AVM.  We believe that this AVM can be treated safely with either surgical resection or radiosurgery.  Given his young age and the feasibility of treatment, we recommend treatment over observation.  Because of its low grade, we recommended surgical resection as the best option with radiosurgery also being a reasonable choice.  We went over the advantages and disadvantages of each.  He understands we are ranking surgical resection first followed by radiosurgery second.  He will review these options and we will contact him in the next 2 weeks.  We do not have any activity restrictions for him.      Please do not hesitate to contact us with questions.      Sincerely,      Carmen Whitney MD

## 2019-09-20 NOTE — LETTER
9/20/2019       RE: Lizz Recinos  [address] [facility] [address]     Dear Colleague,    Thank you for referring your patient, Lizz Recinos, to the [facility] NEUROSURGERY at [facility]. Please see a copy of my visit note below.    See dictated note.    Again, thank you for allowing me to participate in the care of your patient.      Sincerely,    Carmen Whitney MD

## 2019-09-28 ENCOUNTER — ALLIED HEALTH/NURSE VISIT (OUTPATIENT)
Dept: NURSING | Facility: CLINIC | Age: 49
End: 2019-09-28
Payer: COMMERCIAL

## 2019-09-28 DIAGNOSIS — Z23 NEED FOR PROPHYLACTIC VACCINATION AND INOCULATION AGAINST INFLUENZA: Primary | ICD-10-CM

## 2019-09-28 PROCEDURE — 90682 RIV4 VACC RECOMBINANT DNA IM: CPT

## 2019-09-28 PROCEDURE — 90471 IMMUNIZATION ADMIN: CPT

## 2019-10-04 ENCOUNTER — TELEPHONE (OUTPATIENT)
Dept: NEUROLOGY | Facility: CLINIC | Age: 49
End: 2019-10-04

## 2019-10-04 NOTE — TELEPHONE ENCOUNTER
Call placed to see if patient has arrived at a decision on AVM treatment. Per 9/20/19 visit Dr. Whitney has ranked surgical resection first followed by radiosurgery second.    Patient states he is leaning toward surgical resection however he would like to know If all goes well with surgery will he have any deficits?     Message sent to Dr. Whitney to address.

## 2019-10-07 NOTE — TELEPHONE ENCOUNTER
"Per Dr. Whitney - \"By our definition, going well means no deficits long term. Short term, he could have some sensory trouble on the left side but that tends to resolve over time.\"    Informed patient of above. He would like to move forward with resection. He is concerned about insurance coverage and cost to him.     Message sent to Dr. Whitney to place orders.    "

## 2019-10-16 DIAGNOSIS — Q28.2 ARTERIOVENOUS MALFORMATION OF BRAIN: Primary | ICD-10-CM

## 2019-10-17 DIAGNOSIS — Q27.30 AVM (ARTERIOVENOUS MALFORMATION): Primary | ICD-10-CM

## 2019-11-06 ENCOUNTER — HEALTH MAINTENANCE LETTER (OUTPATIENT)
Age: 49
End: 2019-11-06

## 2019-12-06 ENCOUNTER — PREP FOR PROCEDURE (OUTPATIENT)
Dept: NEUROSURGERY | Facility: CLINIC | Age: 49
End: 2019-12-06

## 2019-12-06 DIAGNOSIS — Q28.2 ARTERIOVENOUS MALFORMATION, CEREBRAL: Primary | ICD-10-CM

## 2019-12-09 ENCOUNTER — PATIENT OUTREACH (OUTPATIENT)
Dept: NEUROSURGERY | Facility: CLINIC | Age: 49
End: 2019-12-09

## 2019-12-09 NOTE — TELEPHONE ENCOUNTER
FUTURE VISIT INFORMATION      SURGERY INFORMATION:    Date: 19    Location: [facility] OR    Surgeon:  Wild Houser    Anesthesia Type:  General    RECORDS REQUESTED FROM:       [facility]    Pertinent Medical History: AVM    Most recent EKG+ Tracin19- [facility]- requested tracing

## 2019-12-16 ENCOUNTER — TELEPHONE (OUTPATIENT)
Dept: NEUROLOGY | Facility: CLINIC | Age: 49
End: 2019-12-16

## 2019-12-16 NOTE — TELEPHONE ENCOUNTER
Return voicemail from Lizz regarding appointments.  Confirmed PAC appointment, Health Care Directive and fax number to send STD paperwork.  Informed pt that the team will be notified of his call and incoming STD paperwork.

## 2019-12-18 ENCOUNTER — TELEPHONE (OUTPATIENT)
Dept: NEUROSURGERY | Facility: CLINIC | Age: 49
End: 2019-12-18

## 2019-12-18 NOTE — TELEPHONE ENCOUNTER
Writer received Release of Information via Right Fax on 12/17/2018. No other information has been received.

## 2019-12-18 NOTE — TELEPHONE ENCOUNTER
Writer spoke with patient to inform regarding recieved Release of information and fax number for additional paper. Advised to give fax number to have forms faxed.     Writer informed patient of Date and time of Pac appointment, where to go and the check in process.     All parties expressed understanding.

## 2019-12-20 ENCOUNTER — PRE VISIT (OUTPATIENT)
Dept: SURGERY | Facility: CLINIC | Age: 49
End: 2019-12-20

## 2019-12-20 ENCOUNTER — ANESTHESIA EVENT (OUTPATIENT)
Dept: SURGERY | Facility: CLINIC | Age: 49
DRG: 027 | End: 2019-12-20
Payer: COMMERCIAL

## 2019-12-20 ENCOUNTER — OFFICE VISIT (OUTPATIENT)
Dept: SURGERY | Facility: CLINIC | Age: 49
End: 2019-12-20
Payer: COMMERCIAL

## 2019-12-20 VITALS
BODY MASS INDEX: 22.09 KG/M2 | TEMPERATURE: 97.5 F | SYSTOLIC BLOOD PRESSURE: 125 MMHG | DIASTOLIC BLOOD PRESSURE: 84 MMHG | RESPIRATION RATE: 20 BRPM | HEART RATE: 70 BPM | HEIGHT: 61 IN | OXYGEN SATURATION: 97 % | WEIGHT: 117 LBS

## 2019-12-20 DIAGNOSIS — Z01.818 PREOP EXAMINATION: ICD-10-CM

## 2019-12-20 DIAGNOSIS — Q28.2 ARTERIOVENOUS MALFORMATION, CEREBRAL: ICD-10-CM

## 2019-12-20 DIAGNOSIS — Z01.818 PREOP EXAMINATION: Primary | ICD-10-CM

## 2019-12-20 LAB
ANION GAP SERPL CALCULATED.3IONS-SCNC: 2 MMOL/L (ref 3–14)
BUN SERPL-MCNC: 17 MG/DL (ref 7–30)
CALCIUM SERPL-MCNC: 8.9 MG/DL (ref 8.5–10.1)
CHLORIDE SERPL-SCNC: 104 MMOL/L (ref 94–109)
CO2 SERPL-SCNC: 31 MMOL/L (ref 20–32)
CREAT SERPL-MCNC: 0.72 MG/DL (ref 0.66–1.25)
ERYTHROCYTE [DISTWIDTH] IN BLOOD BY AUTOMATED COUNT: 12.5 % (ref 10–15)
GFR SERPL CREATININE-BSD FRML MDRD: >90 ML/MIN/{1.73_M2}
GLUCOSE SERPL-MCNC: 97 MG/DL (ref 70–99)
HCT VFR BLD AUTO: 45.4 % (ref 40–53)
HGB BLD-MCNC: 15.1 G/DL (ref 13.3–17.7)
INR PPP: 0.99 (ref 0.86–1.14)
MCH RBC QN AUTO: 30.9 PG (ref 26.5–33)
MCHC RBC AUTO-ENTMCNC: 33.3 G/DL (ref 31.5–36.5)
MCV RBC AUTO: 93 FL (ref 78–100)
PLATELET # BLD AUTO: 192 10E9/L (ref 150–450)
POTASSIUM SERPL-SCNC: 3.7 MMOL/L (ref 3.4–5.3)
RBC # BLD AUTO: 4.89 10E12/L (ref 4.4–5.9)
SODIUM SERPL-SCNC: 137 MMOL/L (ref 133–144)
WBC # BLD AUTO: 4.7 10E9/L (ref 4–11)

## 2019-12-20 RX ORDER — ACETAMINOPHEN 500 MG
500 TABLET ORAL EVERY 6 HOURS PRN
COMMUNITY

## 2019-12-20 RX ORDER — MULTIVIT-MIN/IRON/FOLIC ACID/K 18-600-40
2000 CAPSULE ORAL EVERY MORNING
COMMUNITY

## 2019-12-20 ASSESSMENT — MIFFLIN-ST. JEOR: SCORE: 1254.09

## 2019-12-20 ASSESSMENT — PAIN SCALES - GENERAL: PAINLEVEL: NO PAIN (0)

## 2019-12-20 ASSESSMENT — ENCOUNTER SYMPTOMS: SEIZURES: 0

## 2019-12-20 ASSESSMENT — LIFESTYLE VARIABLES: TOBACCO_USE: 0

## 2019-12-20 NOTE — PATIENT INSTRUCTIONS
Preparing for Your Surgery      Name:  Lizz Recinos   MRN:  [id number]   :  1970   Today's Date:  2019     Arriving for surgery:  Surgery date:  19  Arrival time:  5AM  Please come to:       [facility] [address]  [address] [place]  [address]    - ? parking is available in front of the hospital      -    Please proceed to [address] on the 3rd floor. [phone]?     - ?If you are in need of directions, wheelchair or escort please stop at the Information Desk in the lobby.  Inform the information person that you are here for surgery; a wheelchair and escort to [address] will be provided.?     What can I eat or drink?  -  You may have solid food or milk products until 8 hours prior to your surgery. Midnight  -  You may have water, apple juice or 7up/Sprite until 2 hours prior to your surgery. 19, 5AM    Which medicines can I take?  Stop Aspirin, Multivitamins and supplements one week prior to surgery.  Hold Ibuprofen for 24 hours and/or Naproxen for 48 hours prior to surgery.   -  Do NOT take these medications in the morning, the day of surgery:    Vitamin D    -  Please take these medications the day of surgery:    Acetaminophen(Tylenol)    How do I prepare myself?  -  Take two showers: one the night before surgery; and one the morning of surgery.         Use Scrubcare or Hibiclens to wash from neck down, leave soap on your skin for up to one minute.  Do not get soap in your eyes or ears.  You may use your own shampoo and conditioner; no other hair products.   -  Do NOT use lotion, powder, deodorant, or antiperspirant the day of your surgery.  -  Do NOT wear jewelry.  - Do not bring your own medications to the hospital, except for inhalers and eye   drops.  -  Bring your ID and insurance card.    Questions or Concerns:  -If you are scheduled on the [facility] or [facility] and have questions or concerns regarding the day of surgery, please call  Preadmission Nursing at [phone].     -For questions after surgery please call your surgeons office.           AFTER YOUR SURGERY  Breathing exercises   Breathing exercises help you recover faster. Take deep breaths and let the air out slowly. This will:     Help you wake up after surgery.    Help prevent complications like pneumonia.  Preventing complications will help you go home sooner.   We may give you a breathing device (incentive spirometer) to encourage you to breathe deeply.   Nausea and vomiting   You may feel sick to your stomach after surgery; if so, let your nurse know.    Pain control:  After surgery, you may have pain. Our goal is to help you manage your pain. Pain medicine will help you feel comfortable enough to do activities that will help you heal.  These activities may include breathing exercises, walking and physical therapy.   To help your health care team treat your pain we will ask: 1) If you have pain  2) where it is located 3) describe your pain in your words  Methods of pain control include medications given by mouth, vein or by nerve block for some surgeries.  Sequential Compression Device (SCD) or Pneumo Boots:  You may need to wear SCD S on your legs or feet. These are wraps connected to a machine that pumps in air and releases it. The repeated pumping helps prevent blood clots from forming.

## 2019-12-20 NOTE — ANESTHESIA PREPROCEDURE EVALUATION
Anesthesia Pre-Procedure Evaluation    Patient: Lizz Recinos   MRN:     [id number] Gender:   male   Age:    48 year old :      1970        Preoperative Diagnosis: Arteriovenous malformation, cerebral [Q28.2]   Procedure(s):  stealth assisted right parietal craniotomy and resection of arteriovenous malformation / Possible intraoperative cerebral angiography     Past Medical History:   Diagnosis Date     CARDIOVASCULAR SCREENING; LDL GOAL LESS THAN 160       Past Surgical History:   Procedure Laterality Date     IR CAROTID CEREBRAL ANGIOGRAM BILATERAL  2019     NO HISTORY OF SURGERY            Anesthesia Evaluation     . Pt has had prior anesthetic. Type: MAC    No history of anesthetic complications          ROS/MED HX    ENT/Pulmonary:  - neg pulmonary ROS    (-) tobacco use, asthma and sleep apnea   Neurologic: Comment: AVM     (-) seizures, Neuropathy and migraines   Cardiovascular:  - neg cardiovascular ROS   (+) ----. : . . . :. . Previous cardiac testing date:results:date: results:ECG reviewed date:19 results: date: results:          METS/Exercise Tolerance:  4 - Raking leaves, gardening   Hematologic:  - neg hematologic  ROS      (-) History of Transfusion   Musculoskeletal:  - neg musculoskeletal ROS       GI/Hepatic:  - neg GI/hepatic ROS      (-) GERD   Renal/Genitourinary:  - ROS Renal section negative       Endo:  - neg endo ROS       Psychiatric:  - neg psychiatric ROS       Infectious Disease:  - neg infectious disease ROS       Malignancy:      - no malignancy   Other:    (+) no H/O Chronic Pain,                       PHYSICAL EXAM:   Mental Status/Neuro: A/A/O; Age Appropriate   Airway: Facies: Feasible  Mallampati: III  Mouth/Opening: Full  TM distance: > 6 cm  Neck ROM: Full   Respiratory: Auscultation: CTAB     Resp. Rate: Normal     Resp. Effort: Normal      CV: Rhythm: Regular  Rate: Age appropriate  Heart: Normal Sounds  Edema: None   Comments:      Dental: Normal Dentition  "               LABS:  CBC:   Lab Results   Component Value Date    WBC 4.7 12/20/2019    WBC 5.1 09/12/2019    HGB 15.1 12/20/2019    HGB 15.0 09/12/2019    HCT 45.4 12/20/2019    HCT 47.0 09/12/2019     12/20/2019     09/12/2019     BMP:   Lab Results   Component Value Date     12/20/2019     05/07/2019    POTASSIUM 3.7 12/20/2019    POTASSIUM 3.8 05/07/2019    CHLORIDE 104 12/20/2019    CHLORIDE 105 05/07/2019    CO2 31 12/20/2019    CO2 26 05/07/2019    BUN 17 12/20/2019    BUN 13 05/07/2019    CR 0.72 12/20/2019    CR 0.71 09/12/2019    GLC 97 12/20/2019     (H) 05/07/2019     COAGS:   Lab Results   Component Value Date    PTT 30 09/12/2019    INR 0.99 12/20/2019     POC: No results found for: BGM, HCG, HCGS  OTHER:   Lab Results   Component Value Date    A1C 5.2 05/07/2019    ELISEO 8.9 12/20/2019    ALBUMIN 3.8 05/07/2019    PROTTOTAL 7.7 05/07/2019    ALT 19 05/07/2019    AST 14 05/07/2019    ALKPHOS 56 05/07/2019    BILITOTAL 0.2 05/07/2019    TSH 2.13 05/07/2019    SED 12 05/07/2019        Preop Vitals    BP Readings from Last 3 Encounters:   12/20/19 125/84   09/20/19 114/76   09/12/19 105/81    Pulse Readings from Last 3 Encounters:   12/20/19 70   09/20/19 76   09/12/19 82      Resp Readings from Last 3 Encounters:   12/20/19 20   09/20/19 15   09/12/19 18    SpO2 Readings from Last 3 Encounters:   12/20/19 97%   09/20/19 97%   09/12/19 98%      Temp Readings from Last 1 Encounters:   12/20/19 36.4  C (97.5  F) (Oral)    Ht Readings from Last 1 Encounters:   12/20/19 1.549 m (5' 1\")      Wt Readings from Last 1 Encounters:   12/20/19 53.1 kg (117 lb)    Estimated body mass index is 22.11 kg/m  as calculated from the following:    Height as of 12/20/19: 1.549 m (5' 1\").    Weight as of 12/20/19: 53.1 kg (117 lb).     LDA:        Assessment:   ASA SCORE: 2    H&P: History and physical reviewed and following examination; no interval change.   Smoking Status:  Non-Smoker/Unknown "   NPO Status: NPO Appropriate     Plan:   Anes. Type:  General   Pre-Medication: None   Induction:  IV (Standard)   Airway: ETT; Oral   Access/Monitoring: PIV; 2nd PIV; A-Line   Maintenance: Balanced     Postop Plan:   Postop Pain: Opioids  Postop Sedation/Airway: Not planned  Disposition: Inpatient/Admit     PONV Management:   Adult Risk Factors:, Non-Smoker, Postop Opioids   Prevention: Ondansetron, Dexamethasone     CONSENT: Direct conversation   Plan and risks discussed with: Patient   Blood Products: Consented (ALL Blood Products)                PAC Discussion and Assessment    ASA Classification: 2  Case is suitable for: [place]  Anesthetic techniques and relevant risks discussed: GA  Invasive monitoring and risk discussed: No  Types:   Possibility and Risk of blood transfusion discussed: No  NPO instructions given:   Additional anesthetic preparation and risks discussed:   Needs early admission to pre-op area:   Other:     PAC Resident/NP Anesthesia Assessment:  Lizz Recinos is a 48 year old male scheduled to undergo stealth assisted right parietal craniotomy and resection of arteriovenous malformation / Possible intraoperative cerebral angiography with Carmen Whitney MD & Wild Sanz MD on 12/27/19 at [facility] for treatment of a cerebral Arteriovenous malformation.    Pt has had prior anesthetic. Type: MAC - no complications per pt    He has the following specific operative considerations:   # ROMÁN 2/8 = low risk  # VTE risk: 0.26%  # Risk of PONV score = 1.  If > 2, anti-emetic intervention recommended.    # Anesthesia considerations:  Refer to PAC assessment in anesthesia records    CARDIAC: METS 4 - Raking leaves, gardening      # RCRI : High risk surgery (>5% cardiac complication risk).  0.9% risk of major adverse cardiac event.     #  EKG 4/2019: normal    PULMONARY:     # Never smoked    # No asthma or inhaler use    GI: no GERD      ENDO: BMI  21    # No DM    ORTHO: full neck ROM, no TMJ    Patient is optimized and is acceptable candidate for the proposed procedure, provided labs today are within acceptable range. No further diagnostic evaluation is needed.      Reviewed and Signed by PAC Mid-Level Provider/Resident  Mid-Level Provider/Resident: Doris Velez PA-C  Date: 12/20/19  Time: 0829    Attending Anesthesiologist Anesthesia Assessment:        Anesthesiologist:   Date:   Time:   Pass/Fail:   Disposition:     PAC Pharmacist Assessment:        Pharmacist:   Date:   Time:    Alisa Lockett MD

## 2019-12-20 NOTE — H&P
Pre-Operative H & P     CC:  Preoperative exam to assess for increased cardiopulmonary risk while undergoing surgery and anesthesia.    Date of Encounter: 12/20/2019  Primary Care Physician:  Lauri Beltrán  Reason for Visit: Arteriovenous malformation, cerebral     HPI  Lizz Recinos is a 47 y/o male who presents for pre-operative H&P in preparation for stealth assisted right parietal craniotomy and resection of arteriovenous malformation / Possible intraoperative cerebral angiography with Carmen Whitney MD & Wild Sanz MD on 12/27/19 at [facility] for treatment of a cerebral Arteriovenous malformation.     Mr. Recinos has a history of lightheadedness and left-sided tinnitus which resulted in a syncopal episode.  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  He is currently doing well and does not have any active neurological symptoms. An angiogram and MRI shows a small AVM in the right angular gyrus with numerous superficial cortical draining veins. 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 Given his young age and the feasibility of treatment, treatment is recommended over observation. He now presents for the above procedure.    PMH is otherwise unremarkable.     History was obtained from patient & chart review. He is accompanied by his wife & daughter.     Past Medical History  Past Medical History:   Diagnosis Date     CARDIOVASCULAR SCREENING; LDL GOAL LESS THAN 160        Past Surgical History  Past Surgical History:   Procedure Laterality Date     IR CAROTID CEREBRAL ANGIOGRAM BILATERAL  9/12/2019     NO HISTORY OF SURGERY         Hx of Blood transfusions/reactions: no     Hx of abnormal bleeding or anti-platelet use: no    Menstrual history: No LMP for male patient.: N/A    Steroid use in the last year: no    Personal or FH with difficulty with Anesthesia:   no    Prior to Admission Medications  Current Outpatient Medications   Medication Sig Dispense Refill     acetaminophen (TYLENOL) 500 MG tablet Take 500 mg by mouth every 6 hours as needed for mild pain       Multiple Vitamin (MULTIVITAMINS PO) Take 2 tablets by mouth every morning        Vitamin D, Cholecalciferol, 25 MCG (1000 UT) TABS Take 2,000 Units by mouth every morning         Allergies  No Known Allergies    Social History  Social History     Socioeconomic History     Marital status:      Spouse name: Linda     Number of children: 3     Years of education: 14     Highest education level: Not on file   Occupational History     Occupation: Technician - fiber optic     Employer: [employer]   Social Needs     Financial resource strain: Not on file     Food insecurity:     Worry: Not on file     Inability: Not on file     Transportation needs:     Medical: Not on file     Non-medical: Not on file   Tobacco Use     Smoking status: Never Smoker     Smokeless tobacco: Never Used   Substance and Sexual Activity     Alcohol use: Yes     Alcohol/week: 1.7 standard drinks     Types: 2 Standard drinks or equivalent per week     Drug use: No     Sexual activity: Yes     Partners: Female     Birth control/protection: Condom   Lifestyle     Physical activity:     Days per week: Not on file     Minutes per session: Not on file     Stress: Not on file   Relationships     Social connections:     Talks on phone: Not on file     Gets together: Not on file     Attends Restorationist service: Not on file     Active member of club or organization: Not on file     Attends meetings of clubs or organizations: Not on file     Relationship status: Not on file     Intimate partner violence:     Fear of current or ex partner: Not on file     Emotionally abused: Not on file     Physically abused: Not on file     Forced sexual activity: Not on file   Other Topics Concern     Parent/sibling w/ CABG, MI or angioplasty before 65F  55M? No      Service Not Asked     Blood Transfusions Not Asked     Caffeine Concern Yes     Comment: 2 cups daily     Occupational Exposure Not Asked     Hobby Hazards Not Asked     Sleep Concern Not Asked     Stress Concern Not Asked     Weight Concern Not Asked     Special Diet Not Asked     Back Care Not Asked     Exercise Yes     Comment: active     Bike Helmet Not Asked     Seat Belt Yes     Self-Exams Not Asked   Social History Narrative     Not on file       Family History  Family History   Problem Relation Age of Onset     No Known Problems Brother      No Known Problems Brother      No Known Problems Brother      No Known Problems Brother      No Known Problems Sister      No Known Problems Sister      No Known Problems Sister      No Known Problems Daughter      No Known Problems Son      No Known Problems Son      Colon Cancer No family hx of      Prostate Cancer No family hx of      Diabetes No family hx of      Coronary Artery Disease No family hx of      Cerebrovascular Disease No family hx of      Hypertension No family hx of      Breast Cancer No family hx of      Anesthesia Reaction No family hx of      Deep Vein Thrombosis No family hx of        ROS/MED HX  The complete review of systems is negative other than noted in the HPI or here.  Patient denies recent illness, fever and respiratory infection during past month.  Pt denies steroid use during past year.    ENT/Pulmonary:  - neg pulmonary ROS    (-) tobacco use, asthma and sleep apnea   Neurologic: Comment: AVM     (-) seizures, Neuropathy and migraines   Cardiovascular:  - neg cardiovascular ROS   (+) ----. : . . . :. . Previous cardiac testing date:results:date: results:ECG reviewed date:4/8/19 results: date: results:          METS/Exercise Tolerance:  4 - Raking leaves, gardening   Hematologic:  - neg hematologic  ROS      (-) History of Transfusion   Musculoskeletal:  - neg musculoskeletal ROS       GI/Hepatic:  - neg GI/hepatic ROS  "     (-) GERD   Renal/Genitourinary:  - ROS Renal section negative       Endo:  - neg endo ROS       Psychiatric:  - neg psychiatric ROS       Infectious Disease:  - neg infectious disease ROS       Malignancy:      - no malignancy   Other:    (+) no H/O Chronic Pain,             PHYSICAL EXAM:   Mental Status/Neuro: A/A/O; Age Appropriate   Airway: Facies: Feasible  Mallampati: III  Mouth/Opening: Full  TM distance: > 6 cm  Neck ROM: Full   Respiratory: Auscultation: CTAB     Resp. Rate: Normal     Resp. Effort: Normal      CV: Rhythm: Regular  Rate: Age appropriate  Heart: Normal Sounds  Edema: None   Comments:      Dental: Normal Dentition              Preop Vitals    BP Readings from Last 3 Encounters:   12/20/19 125/84   09/20/19 114/76   09/12/19 105/81    Pulse Readings from Last 3 Encounters:   12/20/19 70   09/20/19 76   09/12/19 82      Resp Readings from Last 3 Encounters:   12/20/19 20   09/20/19 15   09/12/19 18    SpO2 Readings from Last 3 Encounters:   12/20/19 97%   09/20/19 97%   09/12/19 98%      Temp Readings from Last 1 Encounters:   12/20/19 97.5  F (36.4  C) (Oral)    Ht Readings from Last 1 Encounters:   12/20/19 1.549 m (5' 1\")      Wt Readings from Last 1 Encounters:   12/20/19 53.1 kg (117 lb)    Estimated body mass index is 22.11 kg/m  as calculated from the following:    Height as of this encounter: 1.549 m (5' 1\").    Weight as of this encounter: 53.1 kg (117 lb).       Temp: 97.5  F (36.4  C) Temp src: Oral BP: 125/84 Pulse: 70   Resp: 20 SpO2: 97 %       117 lbs 0 oz  5' 1\"   Body mass index is 22.11 kg/m .    Physical Exam  Constitutional: Awake, alert, cooperative, no apparent distress, and appears stated age.  Eyes: Pupils equal, round and reactive to light, extra ocular muscles intact, sclera clear, conjunctiva normal.  HENT: Normocephalic, oral pharynx with moist mucus membranes, good dentition. No goiter appreciated. No removable dental hardware.  Respiratory: Clear to " auscultation bilaterally, no crackles or wheezing. No SOB when supine.  Cardiovascular: Regular rate and rhythm, normal S1 and S2, and no murmur noted.  Carotids +2, no bruits. No edema. Palpable pulses to radial, DP and PT arteries.   GI: Normal bowel sounds, soft, non-distended, non-tender, no masses palpated, no hepatomegaly.    Lymph/Hematologic: No cervical lymphadenopathy and no supraclavicular lymphadenopathy.  Genitourinary:  deferred  Skin: Warm and dry.  No rashes at anticipated surgical site.   Musculoskeletal: full ROM of neck. There is no redness, warmth, or swelling of the joints. Gross motor strength is normal.    Neurologic: Awake, alert, oriented to name, place and time. Cranial nerves II-XII are grossly intact. Gait is normal. Ambulates from chair to exam table, seats self, lies supine and sits back up w/o assistance.  Neuropsychiatric: Calm, cooperative. Normal affect. Pleasant. Answers questions appropriately, follows commands w/o difficulty.    PRIOR LABS/DIAGNOSTIC STUDIES:  All labs and imaging personally reviewed     MRI BRAIN WITHOUT AND WITH CONTRAST  5/22/2019  IMPRESSION:    1. Abnormal collection of vessels along the cortical surface in the  right parietal region. This has a prominent feeding vessel and a  prominent draining vein. This finding is consistent with a small  arterial venous malformation.    2. No acute pathology. No acute hemorrhage, no mass effect, no areas  of acute infarction are identified.  3. Opacified left maxillary sinus, left anterior ethmoid sinuses, and  left frontal sinus.     EKG 4/8/19  NSR, Normal ECG, Ventricular rate 86 bpm    Labs today: (personally reviewed)  BMP, CBC, INR, T&S    Outside records reviewed from: Care Everywhere    ASSESSMENT and PLAN  Lizz Recinos is a 48 year old male scheduled to undergo stealth assisted right parietal craniotomy and resection of arteriovenous malformation / Possible intraoperative cerebral angiography with Carmen  MD Chelo & Wild Sanz MD on 12/27/19 at [facility] for treatment of a cerebral Arteriovenous malformation.    Pt has had prior anesthetic. Type: MAC - no complications per pt    He has the following specific operative considerations:   # ROMÁN 2/8 = low risk  # VTE risk: 0.26%  # Risk of PONV score = 1.  If > 2, anti-emetic intervention recommended.    # Anesthesia considerations:  Refer to PAC assessment in anesthesia records    CARDIAC: METS 4 - Raking leaves, gardening      # RCRI : High risk surgery (>5% cardiac complication risk).  0.9% risk of major adverse cardiac event.     #  EKG 4/2019: normal    PULMONARY:     # Never smoked    # No asthma or inhaler use    GI: no GERD      ENDO: BMI 21    # No DM    ORTHO: full neck ROM, no TMJ    Patient is optimized and is acceptable candidate for the proposed procedure, provided labs today are within acceptable range. No further diagnostic evaluation is needed.    Arrival time, NPO, shower and medication instructions provided by nursing staff today.  Preparing For Your Surgery handout given.    Doris Velez PA-C  Preoperative Assessment Center  [facility]  Cl[facility]  Phone: [phone]  Fax: [phone]

## 2019-12-27 ENCOUNTER — ANESTHESIA (OUTPATIENT)
Dept: SURGERY | Facility: CLINIC | Age: 49
DRG: 027 | End: 2019-12-27
Payer: COMMERCIAL

## 2019-12-27 ENCOUNTER — APPOINTMENT (OUTPATIENT)
Dept: INTERVENTIONAL RADIOLOGY/VASCULAR | Facility: CLINIC | Age: 49
DRG: 027 | End: 2019-12-27
Attending: NEUROLOGICAL SURGERY
Payer: COMMERCIAL

## 2019-12-27 ENCOUNTER — HOSPITAL ENCOUNTER (OUTPATIENT)
Dept: MRI IMAGING | Facility: CLINIC | Age: 49
DRG: 027 | End: 2019-12-27
Attending: NEUROLOGICAL SURGERY | Admitting: NEUROLOGICAL SURGERY
Payer: COMMERCIAL

## 2019-12-27 ENCOUNTER — HOSPITAL ENCOUNTER (OUTPATIENT)
Dept: CT IMAGING | Facility: CLINIC | Age: 49
DRG: 027 | End: 2019-12-27
Attending: NEUROLOGICAL SURGERY | Admitting: NEUROLOGICAL SURGERY
Payer: COMMERCIAL

## 2019-12-27 ENCOUNTER — HOSPITAL ENCOUNTER (INPATIENT)
Facility: CLINIC | Age: 49
LOS: 3 days | Discharge: HOME | DRG: 027 | End: 2019-12-30
Attending: NEUROLOGICAL SURGERY | Admitting: NEUROLOGICAL SURGERY
Payer: COMMERCIAL

## 2019-12-27 DIAGNOSIS — Q28.2 ARTERIOVENOUS MALFORMATION, CEREBRAL: ICD-10-CM

## 2019-12-27 DIAGNOSIS — Q27.30 AVM (ARTERIOVENOUS MALFORMATION): ICD-10-CM

## 2019-12-27 LAB
ABO + RH BLD: NORMAL
ABO + RH BLD: NORMAL
BASE EXCESS BLDA CALC-SCNC: 1.4 MMOL/L
BASE EXCESS BLDA CALC-SCNC: 1.7 MMOL/L
BLD GP AB SCN SERPL QL: NORMAL
BLD PROD TYP BPU: NORMAL
BLD UNIT ID BPU: 0
BLD UNIT ID BPU: 0
BLOOD BANK CMNT PATIENT-IMP: NORMAL
BLOOD BANK CMNT PATIENT-IMP: NORMAL
BLOOD PRODUCT CODE: NORMAL
BLOOD PRODUCT CODE: NORMAL
BPU ID: NORMAL
BPU ID: NORMAL
CA-I BLD-MCNC: 4.5 MG/DL (ref 4.4–5.2)
CA-I BLD-MCNC: 4.8 MG/DL (ref 4.4–5.2)
GLUCOSE BLD-MCNC: 129 MG/DL (ref 70–99)
GLUCOSE BLD-MCNC: 148 MG/DL (ref 70–99)
GLUCOSE BLDC GLUCOMTR-MCNC: 114 MG/DL (ref 70–99)
GLUCOSE BLDC GLUCOMTR-MCNC: 128 MG/DL (ref 70–99)
GLUCOSE BLDC GLUCOMTR-MCNC: 169 MG/DL (ref 70–99)
GLUCOSE BLDC GLUCOMTR-MCNC: 171 MG/DL (ref 70–99)
HCO3 BLD-SCNC: 24 MMOL/L (ref 21–28)
HCO3 BLD-SCNC: 25 MMOL/L (ref 21–28)
HGB BLD-MCNC: 11.8 G/DL (ref 13.3–17.7)
HGB BLD-MCNC: 13.4 G/DL (ref 13.3–17.7)
LACTATE BLD-SCNC: 4.1 MMOL/L (ref 0.7–2)
LACTATE BLD-SCNC: 4.4 MMOL/L (ref 0.7–2)
NUM BPU REQUESTED: 2
O2/TOTAL GAS SETTING VFR VENT: 35 %
O2/TOTAL GAS SETTING VFR VENT: ABNORMAL %
PCO2 BLD: 31 MM HG (ref 35–45)
PCO2 BLD: 34 MM HG (ref 35–45)
PH BLD: 7.47 PH (ref 7.35–7.45)
PH BLD: 7.5 PH (ref 7.35–7.45)
PO2 BLD: 161 MM HG (ref 80–105)
PO2 BLD: 257 MM HG (ref 80–105)
POTASSIUM BLD-SCNC: 3.4 MMOL/L (ref 3.4–5.3)
POTASSIUM BLD-SCNC: 3.6 MMOL/L (ref 3.4–5.3)
SODIUM BLD-SCNC: 144 MMOL/L (ref 133–144)
SODIUM BLD-SCNC: 145 MMOL/L (ref 133–144)
SPECIMEN EXP DATE BLD: NORMAL
TRANSFUSION STATUS PATIENT QL: NORMAL

## 2019-12-27 PROCEDURE — 84295 ASSAY OF SERUM SODIUM: CPT | Performed by: NEUROLOGICAL SURGERY

## 2019-12-27 PROCEDURE — 36000076 ZZH SURGERY LEVEL 6 EA 15 ADDTL MIN - UMMC: Performed by: NEUROLOGICAL SURGERY

## 2019-12-27 PROCEDURE — 25000125 ZZHC RX 250: Performed by: NURSE ANESTHETIST, CERTIFIED REGISTERED

## 2019-12-27 PROCEDURE — 25000132 ZZH RX MED GY IP 250 OP 250 PS 637: Performed by: STUDENT IN AN ORGANIZED HEALTH CARE EDUCATION/TRAINING PROGRAM

## 2019-12-27 PROCEDURE — 82803 BLOOD GASES ANY COMBINATION: CPT | Performed by: NEUROLOGICAL SURGERY

## 2019-12-27 PROCEDURE — 25000128 H RX IP 250 OP 636: Performed by: STUDENT IN AN ORGANIZED HEALTH CARE EDUCATION/TRAINING PROGRAM

## 2019-12-27 PROCEDURE — 71000017 ZZH RECOVERY PHASE 1 LEVEL 3 EA ADDTL HR: Performed by: NEUROLOGICAL SURGERY

## 2019-12-27 PROCEDURE — 25800030 ZZH RX IP 258 OP 636: Performed by: NURSE ANESTHETIST, CERTIFIED REGISTERED

## 2019-12-27 PROCEDURE — C1894 INTRO/SHEATH, NON-LASER: HCPCS | Performed by: NEUROLOGICAL SURGERY

## 2019-12-27 PROCEDURE — 25000128 H RX IP 250 OP 636: Performed by: NEUROLOGICAL SURGERY

## 2019-12-27 PROCEDURE — 25500064 ZZH RX 255 OP 636: Performed by: NEUROLOGICAL SURGERY

## 2019-12-27 PROCEDURE — 82947 ASSAY GLUCOSE BLOOD QUANT: CPT | Performed by: NEUROLOGICAL SURGERY

## 2019-12-27 PROCEDURE — 00N10ZZ RELEASE CEREBRAL MENINGES, OPEN APPROACH: ICD-10-PCS | Performed by: NEUROLOGICAL SURGERY

## 2019-12-27 PROCEDURE — 84132 ASSAY OF SERUM POTASSIUM: CPT | Performed by: NEUROLOGICAL SURGERY

## 2019-12-27 PROCEDURE — P9016 RBC LEUKOCYTES REDUCED: HCPCS | Performed by: PHYSICIAN ASSISTANT

## 2019-12-27 PROCEDURE — 70552 MRI BRAIN STEM W/DYE: CPT

## 2019-12-27 PROCEDURE — 25000125 ZZHC RX 250: Performed by: STUDENT IN AN ORGANIZED HEALTH CARE EDUCATION/TRAINING PROGRAM

## 2019-12-27 PROCEDURE — 27211022 ZZHC OR IOM SUPPLIES OPNP: Performed by: NEUROLOGICAL SURGERY

## 2019-12-27 PROCEDURE — 00000146 ZZHCL STATISTIC GLUCOSE BY METER IP

## 2019-12-27 PROCEDURE — 25500064 ZZH RX 255 OP 636: Performed by: RADIOLOGY

## 2019-12-27 PROCEDURE — 40000014 ZZH STATISTIC ARTERIAL MONITORING DAILY

## 2019-12-27 PROCEDURE — 71000016 ZZH RECOVERY PHASE 1 LEVEL 3 FIRST HR: Performed by: NEUROLOGICAL SURGERY

## 2019-12-27 PROCEDURE — 87640 STAPH A DNA AMP PROBE: CPT | Performed by: INTERNAL MEDICINE

## 2019-12-27 PROCEDURE — 87641 MR-STAPH DNA AMP PROBE: CPT | Performed by: INTERNAL MEDICINE

## 2019-12-27 PROCEDURE — 88307 TISSUE EXAM BY PATHOLOGIST: CPT | Performed by: NEUROLOGICAL SURGERY

## 2019-12-27 PROCEDURE — 05BL0ZZ EXCISION OF INTRACRANIAL VEIN, OPEN APPROACH: ICD-10-PCS | Performed by: NEUROLOGICAL SURGERY

## 2019-12-27 PROCEDURE — 27210794 ZZH OR GENERAL SUPPLY STERILE: Performed by: NEUROLOGICAL SURGERY

## 2019-12-27 PROCEDURE — 03BG0ZZ EXCISION OF INTRACRANIAL ARTERY, OPEN APPROACH: ICD-10-PCS | Performed by: NEUROLOGICAL SURGERY

## 2019-12-27 PROCEDURE — 25000128 H RX IP 250 OP 636: Performed by: NURSE ANESTHETIST, CERTIFIED REGISTERED

## 2019-12-27 PROCEDURE — B31R1ZZ FLUOROSCOPY OF INTRACRANIAL ARTERIES USING LOW OSMOLAR CONTRAST: ICD-10-PCS | Performed by: NEUROLOGICAL SURGERY

## 2019-12-27 PROCEDURE — 40000170 ZZH STATISTIC PRE-PROCEDURE ASSESSMENT II: Performed by: NEUROLOGICAL SURGERY

## 2019-12-27 PROCEDURE — 36000074 ZZH SURGERY LEVEL 6 1ST 30 MIN - UMMC: Performed by: NEUROLOGICAL SURGERY

## 2019-12-27 PROCEDURE — 25800030 ZZH RX IP 258 OP 636: Performed by: STUDENT IN AN ORGANIZED HEALTH CARE EDUCATION/TRAINING PROGRAM

## 2019-12-27 PROCEDURE — 25000125 ZZHC RX 250: Performed by: NEUROLOGICAL SURGERY

## 2019-12-27 PROCEDURE — 37000008 ZZH ANESTHESIA TECHNICAL FEE, 1ST 30 MIN: Performed by: NEUROLOGICAL SURGERY

## 2019-12-27 PROCEDURE — 37000009 ZZH ANESTHESIA TECHNICAL FEE, EACH ADDTL 15 MIN: Performed by: NEUROLOGICAL SURGERY

## 2019-12-27 PROCEDURE — 27810169 ZZH OR IMPLANT GENERAL: Performed by: NEUROLOGICAL SURGERY

## 2019-12-27 PROCEDURE — A9585 GADOBUTROL INJECTION: HCPCS | Performed by: RADIOLOGY

## 2019-12-27 PROCEDURE — 25800030 ZZH RX IP 258 OP 636: Performed by: NEUROLOGICAL SURGERY

## 2019-12-27 PROCEDURE — 25000566 ZZH SEVOFLURANE, EA 15 MIN: Performed by: NEUROLOGICAL SURGERY

## 2019-12-27 PROCEDURE — 40000003 ZZH STATISTIC IR STAFF TIME IN THE OR

## 2019-12-27 PROCEDURE — P9041 ALBUMIN (HUMAN),5%, 50ML: HCPCS | Performed by: NURSE ANESTHETIST, CERTIFIED REGISTERED

## 2019-12-27 PROCEDURE — 70450 CT HEAD/BRAIN W/O DYE: CPT

## 2019-12-27 PROCEDURE — 83605 ASSAY OF LACTIC ACID: CPT | Performed by: NEUROLOGICAL SURGERY

## 2019-12-27 PROCEDURE — C1713 ANCHOR/SCREW BN/BN,TIS/BN: HCPCS | Performed by: NEUROLOGICAL SURGERY

## 2019-12-27 PROCEDURE — C1769 GUIDE WIRE: HCPCS | Performed by: NEUROLOGICAL SURGERY

## 2019-12-27 PROCEDURE — 27211024 ZZHC OR SUPPLY OTHER OPNP: Performed by: NEUROLOGICAL SURGERY

## 2019-12-27 PROCEDURE — 8E09XBZ COMPUTER ASSISTED PROCEDURE OF HEAD AND NECK REGION: ICD-10-PCS | Performed by: NEUROLOGICAL SURGERY

## 2019-12-27 PROCEDURE — 4A10X4G MONITORING OF CENTRAL NERVOUS ELECTRICAL ACTIVITY, INTRAOPERATIVE, EXTERNAL APPROACH: ICD-10-PCS | Performed by: NEUROLOGICAL SURGERY

## 2019-12-27 PROCEDURE — 40000275 ZZH STATISTIC RCP TIME EA 10 MIN

## 2019-12-27 PROCEDURE — 20000004 ZZH R&B ICU UMMC

## 2019-12-27 PROCEDURE — 00N20ZZ RELEASE DURA MATER, OPEN APPROACH: ICD-10-PCS | Performed by: NEUROLOGICAL SURGERY

## 2019-12-27 PROCEDURE — C1763 CONN TISS, NON-HUMAN: HCPCS | Performed by: NEUROLOGICAL SURGERY

## 2019-12-27 PROCEDURE — 82330 ASSAY OF CALCIUM: CPT | Performed by: NEUROLOGICAL SURGERY

## 2019-12-27 DEVICE — IMP PLATE SYN BOX LOW PROFILE 04H TI 421.511: Type: IMPLANTABLE DEVICE | Site: CRANIAL | Status: FUNCTIONAL

## 2019-12-27 DEVICE — CLIP SUGITA AVM 3MM CVD 07-932-03: Type: IMPLANTABLE DEVICE | Site: BRAIN | Status: FUNCTIONAL

## 2019-12-27 DEVICE — IMP BUR HOLE COVER 17MM LOW PROFILE TI 421.527: Type: IMPLANTABLE DEVICE | Site: CRANIAL | Status: FUNCTIONAL

## 2019-12-27 DEVICE — IMPLANTABLE DEVICE: Type: IMPLANTABLE DEVICE | Site: BRAIN | Status: FUNCTIONAL

## 2019-12-27 DEVICE — GRAFT DURAGEN 3X3" ID330: Type: IMPLANTABLE DEVICE | Site: BRAIN | Status: FUNCTIONAL

## 2019-12-27 DEVICE — IMP SCR SYN MATRIX LOW PRO 1.5X04MM SELF DRILL 04.503.104.01: Type: IMPLANTABLE DEVICE | Site: CRANIAL | Status: FUNCTIONAL

## 2019-12-27 RX ORDER — ONDANSETRON 4 MG/1
4 TABLET, ORALLY DISINTEGRATING ORAL EVERY 30 MIN PRN
Status: DISCONTINUED | OUTPATIENT
Start: 2019-12-27 | End: 2019-12-27 | Stop reason: HOSPADM

## 2019-12-27 RX ORDER — LIDOCAINE HYDROCHLORIDE 20 MG/ML
INJECTION, SOLUTION INFILTRATION; PERINEURAL PRN
Status: DISCONTINUED | OUTPATIENT
Start: 2019-12-27 | End: 2019-12-27

## 2019-12-27 RX ORDER — PROCHLORPERAZINE MALEATE 10 MG
10 TABLET ORAL EVERY 6 HOURS PRN
Status: DISCONTINUED | OUTPATIENT
Start: 2019-12-27 | End: 2019-12-30 | Stop reason: HOSPADM

## 2019-12-27 RX ORDER — MANNITOL 20 G/100ML
INJECTION, SOLUTION INTRAVENOUS PRN
Status: DISCONTINUED | OUTPATIENT
Start: 2019-12-27 | End: 2019-12-27

## 2019-12-27 RX ORDER — PROCHLORPERAZINE 25 MG
25 SUPPOSITORY, RECTAL RECTAL EVERY 12 HOURS PRN
Status: DISCONTINUED | OUTPATIENT
Start: 2019-12-27 | End: 2019-12-30 | Stop reason: HOSPADM

## 2019-12-27 RX ORDER — EPHEDRINE SULFATE 50 MG/ML
INJECTION, SOLUTION INTRAMUSCULAR; INTRAVENOUS; SUBCUTANEOUS PRN
Status: DISCONTINUED | OUTPATIENT
Start: 2019-12-27 | End: 2019-12-27

## 2019-12-27 RX ORDER — LIDOCAINE HYDROCHLORIDE AND EPINEPHRINE 10; 10 MG/ML; UG/ML
INJECTION, SOLUTION INFILTRATION; PERINEURAL PRN
Status: DISCONTINUED | OUTPATIENT
Start: 2019-12-27 | End: 2019-12-27 | Stop reason: HOSPADM

## 2019-12-27 RX ORDER — ESMOLOL HYDROCHLORIDE 10 MG/ML
INJECTION INTRAVENOUS PRN
Status: DISCONTINUED | OUTPATIENT
Start: 2019-12-27 | End: 2019-12-27

## 2019-12-27 RX ORDER — DEXAMETHASONE SODIUM PHOSPHATE 4 MG/ML
INJECTION, SOLUTION INTRA-ARTICULAR; INTRALESIONAL; INTRAMUSCULAR; INTRAVENOUS; SOFT TISSUE PRN
Status: DISCONTINUED | OUTPATIENT
Start: 2019-12-27 | End: 2019-12-27

## 2019-12-27 RX ORDER — NALOXONE HYDROCHLORIDE 0.4 MG/ML
.1-.4 INJECTION, SOLUTION INTRAMUSCULAR; INTRAVENOUS; SUBCUTANEOUS
Status: DISCONTINUED | OUTPATIENT
Start: 2019-12-27 | End: 2019-12-27

## 2019-12-27 RX ORDER — LIDOCAINE 40 MG/G
CREAM TOPICAL
Status: DISCONTINUED | OUTPATIENT
Start: 2019-12-27 | End: 2019-12-27 | Stop reason: HOSPADM

## 2019-12-27 RX ORDER — FENTANYL CITRATE 50 UG/ML
25-50 INJECTION, SOLUTION INTRAMUSCULAR; INTRAVENOUS
Status: DISCONTINUED | OUTPATIENT
Start: 2019-12-27 | End: 2019-12-27 | Stop reason: HOSPADM

## 2019-12-27 RX ORDER — LEVETIRACETAM 10 MG/ML
1000 INJECTION INTRAVASCULAR ONCE
Status: COMPLETED | OUTPATIENT
Start: 2019-12-27 | End: 2019-12-27

## 2019-12-27 RX ORDER — ONDANSETRON 2 MG/ML
INJECTION INTRAMUSCULAR; INTRAVENOUS PRN
Status: DISCONTINUED | OUTPATIENT
Start: 2019-12-27 | End: 2019-12-27

## 2019-12-27 RX ORDER — VITAMIN B COMPLEX
2000 TABLET ORAL EVERY MORNING
Status: DISCONTINUED | OUTPATIENT
Start: 2019-12-28 | End: 2019-12-30 | Stop reason: HOSPADM

## 2019-12-27 RX ORDER — LEVETIRACETAM 500 MG/1
500 TABLET ORAL 2 TIMES DAILY
Status: DISCONTINUED | OUTPATIENT
Start: 2019-12-27 | End: 2019-12-30 | Stop reason: HOSPADM

## 2019-12-27 RX ORDER — ONDANSETRON 2 MG/ML
4-8 INJECTION INTRAMUSCULAR; INTRAVENOUS EVERY 6 HOURS PRN
Status: DISCONTINUED | OUTPATIENT
Start: 2019-12-27 | End: 2019-12-30 | Stop reason: HOSPADM

## 2019-12-27 RX ORDER — LIDOCAINE 40 MG/G
CREAM TOPICAL
Status: DISCONTINUED | OUTPATIENT
Start: 2019-12-27 | End: 2019-12-30 | Stop reason: HOSPADM

## 2019-12-27 RX ORDER — GADOBUTROL 604.72 MG/ML
7.5 INJECTION INTRAVENOUS ONCE
Status: COMPLETED | OUTPATIENT
Start: 2019-12-27 | End: 2019-12-27

## 2019-12-27 RX ORDER — NICOTINE POLACRILEX 4 MG
15-30 LOZENGE BUCCAL
Status: DISCONTINUED | OUTPATIENT
Start: 2019-12-27 | End: 2019-12-29

## 2019-12-27 RX ORDER — CEFAZOLIN SODIUM 2 G/100ML
2 INJECTION, SOLUTION INTRAVENOUS
Status: COMPLETED | OUTPATIENT
Start: 2019-12-27 | End: 2019-12-27

## 2019-12-27 RX ORDER — ACETAMINOPHEN 325 MG/1
650 TABLET ORAL EVERY 4 HOURS PRN
Status: DISCONTINUED | OUTPATIENT
Start: 2019-12-30 | End: 2019-12-30 | Stop reason: HOSPADM

## 2019-12-27 RX ORDER — SODIUM CHLORIDE, SODIUM LACTATE, POTASSIUM CHLORIDE, CALCIUM CHLORIDE 600; 310; 30; 20 MG/100ML; MG/100ML; MG/100ML; MG/100ML
INJECTION, SOLUTION INTRAVENOUS CONTINUOUS
Status: DISCONTINUED | OUTPATIENT
Start: 2019-12-27 | End: 2019-12-27 | Stop reason: HOSPADM

## 2019-12-27 RX ORDER — PEDI MULTIVIT NO.128/VITAMIN K 500 MCG/ML
1 LIQUID (ML) ORAL EVERY MORNING
Status: DISCONTINUED | OUTPATIENT
Start: 2019-12-28 | End: 2019-12-30 | Stop reason: HOSPADM

## 2019-12-27 RX ORDER — AMOXICILLIN 250 MG
1 CAPSULE ORAL 2 TIMES DAILY
Status: DISCONTINUED | OUTPATIENT
Start: 2019-12-27 | End: 2019-12-29

## 2019-12-27 RX ORDER — MAGNESIUM SULFATE HEPTAHYDRATE 40 MG/ML
4 INJECTION, SOLUTION INTRAVENOUS EVERY 4 HOURS PRN
Status: DISCONTINUED | OUTPATIENT
Start: 2019-12-27 | End: 2019-12-30 | Stop reason: HOSPADM

## 2019-12-27 RX ORDER — PROPOFOL 10 MG/ML
INJECTION, EMULSION INTRAVENOUS PRN
Status: DISCONTINUED | OUTPATIENT
Start: 2019-12-27 | End: 2019-12-27

## 2019-12-27 RX ORDER — DEXTROSE MONOHYDRATE 25 G/50ML
25-50 INJECTION, SOLUTION INTRAVENOUS
Status: DISCONTINUED | OUTPATIENT
Start: 2019-12-27 | End: 2019-12-29

## 2019-12-27 RX ORDER — ACETAMINOPHEN 325 MG/1
975 TABLET ORAL ONCE
Status: COMPLETED | OUTPATIENT
Start: 2019-12-27 | End: 2019-12-27

## 2019-12-27 RX ORDER — POTASSIUM CHLORIDE 29.8 MG/ML
20 INJECTION INTRAVENOUS
Status: DISCONTINUED | OUTPATIENT
Start: 2019-12-27 | End: 2019-12-30 | Stop reason: HOSPADM

## 2019-12-27 RX ORDER — CEFAZOLIN SODIUM 1 G/3ML
1 INJECTION, POWDER, FOR SOLUTION INTRAMUSCULAR; INTRAVENOUS SEE ADMIN INSTRUCTIONS
Status: DISCONTINUED | OUTPATIENT
Start: 2019-12-27 | End: 2019-12-27 | Stop reason: HOSPADM

## 2019-12-27 RX ORDER — ACETAMINOPHEN 325 MG/1
975 TABLET ORAL EVERY 8 HOURS
Status: DISCONTINUED | OUTPATIENT
Start: 2019-12-27 | End: 2019-12-30 | Stop reason: HOSPADM

## 2019-12-27 RX ORDER — OXYCODONE HYDROCHLORIDE 5 MG/1
5-10 TABLET ORAL
Status: DISCONTINUED | OUTPATIENT
Start: 2019-12-27 | End: 2019-12-30 | Stop reason: HOSPADM

## 2019-12-27 RX ORDER — POTASSIUM CHLORIDE 1.5 G/1.58G
20-40 POWDER, FOR SOLUTION ORAL
Status: DISCONTINUED | OUTPATIENT
Start: 2019-12-27 | End: 2019-12-30 | Stop reason: HOSPADM

## 2019-12-27 RX ORDER — ONDANSETRON 4 MG/1
4-8 TABLET, ORALLY DISINTEGRATING ORAL EVERY 6 HOURS PRN
Status: DISCONTINUED | OUTPATIENT
Start: 2019-12-27 | End: 2019-12-30 | Stop reason: HOSPADM

## 2019-12-27 RX ORDER — NALOXONE HYDROCHLORIDE 0.4 MG/ML
.1-.4 INJECTION, SOLUTION INTRAMUSCULAR; INTRAVENOUS; SUBCUTANEOUS
Status: DISCONTINUED | OUTPATIENT
Start: 2019-12-27 | End: 2019-12-30 | Stop reason: HOSPADM

## 2019-12-27 RX ORDER — IODIXANOL 320 MG/ML
INJECTION, SOLUTION INTRAVASCULAR PRN
Status: DISCONTINUED | OUTPATIENT
Start: 2019-12-27 | End: 2019-12-27 | Stop reason: HOSPADM

## 2019-12-27 RX ORDER — SODIUM CHLORIDE, SODIUM LACTATE, POTASSIUM CHLORIDE, CALCIUM CHLORIDE 600; 310; 30; 20 MG/100ML; MG/100ML; MG/100ML; MG/100ML
INJECTION, SOLUTION INTRAVENOUS CONTINUOUS PRN
Status: DISCONTINUED | OUTPATIENT
Start: 2019-12-27 | End: 2019-12-27

## 2019-12-27 RX ORDER — POTASSIUM CHLORIDE 7.45 MG/ML
10 INJECTION INTRAVENOUS
Status: DISCONTINUED | OUTPATIENT
Start: 2019-12-27 | End: 2019-12-30 | Stop reason: HOSPADM

## 2019-12-27 RX ORDER — LABETALOL 20 MG/4 ML (5 MG/ML) INTRAVENOUS SYRINGE
10-40 EVERY 10 MIN PRN
Status: DISCONTINUED | OUTPATIENT
Start: 2019-12-27 | End: 2019-12-30 | Stop reason: HOSPADM

## 2019-12-27 RX ORDER — ALBUMIN, HUMAN INJ 5% 5 %
SOLUTION INTRAVENOUS CONTINUOUS PRN
Status: DISCONTINUED | OUTPATIENT
Start: 2019-12-27 | End: 2019-12-27

## 2019-12-27 RX ORDER — GABAPENTIN 300 MG/1
300 CAPSULE ORAL ONCE
Status: COMPLETED | OUTPATIENT
Start: 2019-12-27 | End: 2019-12-27

## 2019-12-27 RX ORDER — HEPARIN SOD,PORCINE/0.9 % NACL 5K/1000 ML
INTRAVENOUS SOLUTION INTRAVENOUS PRN
Status: DISCONTINUED | OUTPATIENT
Start: 2019-12-27 | End: 2019-12-27 | Stop reason: HOSPADM

## 2019-12-27 RX ORDER — HYDROMORPHONE HYDROCHLORIDE 1 MG/ML
.3-.5 INJECTION, SOLUTION INTRAMUSCULAR; INTRAVENOUS; SUBCUTANEOUS EVERY 5 MIN PRN
Status: DISCONTINUED | OUTPATIENT
Start: 2019-12-27 | End: 2019-12-27 | Stop reason: HOSPADM

## 2019-12-27 RX ORDER — POTASSIUM CL/LIDO/0.9 % NACL 10MEQ/0.1L
10 INTRAVENOUS SOLUTION, PIGGYBACK (ML) INTRAVENOUS
Status: DISCONTINUED | OUTPATIENT
Start: 2019-12-27 | End: 2019-12-30 | Stop reason: HOSPADM

## 2019-12-27 RX ORDER — POLYETHYLENE GLYCOL 3350 17 G/17G
17 POWDER, FOR SOLUTION ORAL 3 TIMES DAILY
Status: DISCONTINUED | OUTPATIENT
Start: 2019-12-27 | End: 2019-12-29

## 2019-12-27 RX ORDER — ONDANSETRON 2 MG/ML
4 INJECTION INTRAMUSCULAR; INTRAVENOUS EVERY 30 MIN PRN
Status: DISCONTINUED | OUTPATIENT
Start: 2019-12-27 | End: 2019-12-27 | Stop reason: HOSPADM

## 2019-12-27 RX ORDER — FENTANYL CITRATE 50 UG/ML
INJECTION, SOLUTION INTRAMUSCULAR; INTRAVENOUS PRN
Status: DISCONTINUED | OUTPATIENT
Start: 2019-12-27 | End: 2019-12-27

## 2019-12-27 RX ORDER — SODIUM CHLORIDE 9 MG/ML
INJECTION, SOLUTION INTRAVENOUS CONTINUOUS
Status: DISCONTINUED | OUTPATIENT
Start: 2019-12-27 | End: 2019-12-30 | Stop reason: HOSPADM

## 2019-12-27 RX ORDER — POTASSIUM CHLORIDE 750 MG/1
20-40 TABLET, EXTENDED RELEASE ORAL
Status: DISCONTINUED | OUTPATIENT
Start: 2019-12-27 | End: 2019-12-30 | Stop reason: HOSPADM

## 2019-12-27 RX ORDER — HYDRALAZINE HYDROCHLORIDE 20 MG/ML
10-20 INJECTION INTRAMUSCULAR; INTRAVENOUS EVERY 30 MIN PRN
Status: DISCONTINUED | OUTPATIENT
Start: 2019-12-27 | End: 2019-12-30 | Stop reason: HOSPADM

## 2019-12-27 RX ADMIN — LIDOCAINE HYDROCHLORIDE 100 MG: 20 INJECTION, SOLUTION INFILTRATION; PERINEURAL at 08:13

## 2019-12-27 RX ADMIN — LEVETIRACETAM 500 MG: 500 TABLET, FILM COATED ORAL at 20:29

## 2019-12-27 RX ADMIN — GADOBUTROL 7.5 ML: 604.72 INJECTION INTRAVENOUS at 06:59

## 2019-12-27 RX ADMIN — POTASSIUM CHLORIDE 20 MEQ: 750 TABLET, EXTENDED RELEASE ORAL at 20:47

## 2019-12-27 RX ADMIN — ALBUMIN HUMAN: 0.05 INJECTION, SOLUTION INTRAVENOUS at 17:00

## 2019-12-27 RX ADMIN — LABETALOL 20 MG/4 ML (5 MG/ML) INTRAVENOUS SYRINGE 10 MG: at 17:48

## 2019-12-27 RX ADMIN — REMIFENTANIL HYDROCHLORIDE 0.2 MCG/KG/MIN: 1 INJECTION, POWDER, LYOPHILIZED, FOR SOLUTION INTRAVENOUS at 08:53

## 2019-12-27 RX ADMIN — ONDANSETRON 4 MG: 2 INJECTION INTRAMUSCULAR; INTRAVENOUS at 16:45

## 2019-12-27 RX ADMIN — SODIUM CHLORIDE, POTASSIUM CHLORIDE, SODIUM LACTATE AND CALCIUM CHLORIDE: 600; 310; 30; 20 INJECTION, SOLUTION INTRAVENOUS at 07:49

## 2019-12-27 RX ADMIN — LABETALOL 20 MG/4 ML (5 MG/ML) INTRAVENOUS SYRINGE 20 MG: at 20:29

## 2019-12-27 RX ADMIN — PHENYLEPHRINE HYDROCHLORIDE 200 MCG: 10 INJECTION INTRAVENOUS at 08:22

## 2019-12-27 RX ADMIN — SODIUM CHLORIDE, POTASSIUM CHLORIDE, SODIUM LACTATE AND CALCIUM CHLORIDE: 600; 310; 30; 20 INJECTION, SOLUTION INTRAVENOUS at 16:30

## 2019-12-27 RX ADMIN — ROCURONIUM BROMIDE 40 MG: 10 INJECTION INTRAVENOUS at 15:32

## 2019-12-27 RX ADMIN — SUGAMMADEX 200 MG: 100 INJECTION, SOLUTION INTRAVENOUS at 17:10

## 2019-12-27 RX ADMIN — LABETALOL 20 MG/4 ML (5 MG/ML) INTRAVENOUS SYRINGE 10 MG: at 20:13

## 2019-12-27 RX ADMIN — PROPOFOL 110 MG: 10 INJECTION, EMULSION INTRAVENOUS at 08:13

## 2019-12-27 RX ADMIN — PROPOFOL 30 MG: 10 INJECTION, EMULSION INTRAVENOUS at 08:33

## 2019-12-27 RX ADMIN — PHENYLEPHRINE HYDROCHLORIDE 200 MCG: 10 INJECTION INTRAVENOUS at 08:14

## 2019-12-27 RX ADMIN — LABETALOL 20 MG/4 ML (5 MG/ML) INTRAVENOUS SYRINGE 40 MG: at 20:42

## 2019-12-27 RX ADMIN — CEFAZOLIN 1 G: 1 INJECTION, POWDER, FOR SOLUTION INTRAMUSCULAR; INTRAVENOUS at 10:15

## 2019-12-27 RX ADMIN — MANNITOL 50 G: 20 INJECTION, SOLUTION INTRAVENOUS at 08:38

## 2019-12-27 RX ADMIN — CEFAZOLIN 1 G: 1 INJECTION, POWDER, FOR SOLUTION INTRAMUSCULAR; INTRAVENOUS at 12:15

## 2019-12-27 RX ADMIN — ESMOLOL HYDROCHLORIDE 20 MG: 10 INJECTION, SOLUTION INTRAVENOUS at 17:25

## 2019-12-27 RX ADMIN — LEVETIRACETAM 1 G: 10 INJECTION INTRAVENOUS at 14:53

## 2019-12-27 RX ADMIN — ACETAMINOPHEN 975 MG: 325 TABLET, FILM COATED ORAL at 06:26

## 2019-12-27 RX ADMIN — PHENYLEPHRINE HYDROCHLORIDE 0.3 MCG/KG/MIN: 10 INJECTION INTRAVENOUS at 08:30

## 2019-12-27 RX ADMIN — HYDROMORPHONE HYDROCHLORIDE 0.5 MG: 1 INJECTION, SOLUTION INTRAMUSCULAR; INTRAVENOUS; SUBCUTANEOUS at 16:57

## 2019-12-27 RX ADMIN — GABAPENTIN 300 MG: 300 CAPSULE ORAL at 06:26

## 2019-12-27 RX ADMIN — SODIUM CHLORIDE, POTASSIUM CHLORIDE, SODIUM LACTATE AND CALCIUM CHLORIDE: 600; 310; 30; 20 INJECTION, SOLUTION INTRAVENOUS at 13:35

## 2019-12-27 RX ADMIN — DEXAMETHASONE SODIUM PHOSPHATE 6 MG: 4 INJECTION, SOLUTION INTRA-ARTICULAR; INTRALESIONAL; INTRAMUSCULAR; INTRAVENOUS; SOFT TISSUE at 08:53

## 2019-12-27 RX ADMIN — CEFAZOLIN SODIUM 2 G: 2 INJECTION, SOLUTION INTRAVENOUS at 08:23

## 2019-12-27 RX ADMIN — ROCURONIUM BROMIDE 10 MG: 10 INJECTION INTRAVENOUS at 16:17

## 2019-12-27 RX ADMIN — Medication 10 MG: at 09:20

## 2019-12-27 RX ADMIN — PROPOFOL 30 MG: 10 INJECTION, EMULSION INTRAVENOUS at 08:32

## 2019-12-27 RX ADMIN — Medication 2.5 MG: at 11:40

## 2019-12-27 RX ADMIN — REMIFENTANIL HYDROCHLORIDE 0.1 MCG/KG/MIN: 1 INJECTION, POWDER, LYOPHILIZED, FOR SOLUTION INTRAVENOUS at 14:01

## 2019-12-27 RX ADMIN — CEFAZOLIN 1 G: 1 INJECTION, POWDER, FOR SOLUTION INTRAMUSCULAR; INTRAVENOUS at 16:15

## 2019-12-27 RX ADMIN — Medication 2.5 MG: at 12:33

## 2019-12-27 RX ADMIN — SODIUM CHLORIDE, POTASSIUM CHLORIDE, SODIUM LACTATE AND CALCIUM CHLORIDE: 600; 310; 30; 20 INJECTION, SOLUTION INTRAVENOUS at 13:34

## 2019-12-27 RX ADMIN — PROPOFOL 30 MG: 10 INJECTION, EMULSION INTRAVENOUS at 08:30

## 2019-12-27 RX ADMIN — CEFAZOLIN 1 G: 1 INJECTION, POWDER, FOR SOLUTION INTRAMUSCULAR; INTRAVENOUS at 14:15

## 2019-12-27 RX ADMIN — FENTANYL CITRATE 250 MCG: 50 INJECTION, SOLUTION INTRAMUSCULAR; INTRAVENOUS at 08:13

## 2019-12-27 RX ADMIN — ROCURONIUM BROMIDE 50 MG: 10 INJECTION INTRAVENOUS at 08:13

## 2019-12-27 RX ADMIN — SODIUM CHLORIDE, POTASSIUM CHLORIDE, SODIUM LACTATE AND CALCIUM CHLORIDE: 600; 310; 30; 20 INJECTION, SOLUTION INTRAVENOUS at 08:39

## 2019-12-27 RX ADMIN — NICARDIPINE HYDROCHLORIDE 2.5 MG/HR: 0.2 INJECTION, SOLUTION INTRAVENOUS at 21:03

## 2019-12-27 RX ADMIN — FENTANYL CITRATE 50 MCG: 50 INJECTION, SOLUTION INTRAMUSCULAR; INTRAVENOUS at 18:03

## 2019-12-27 RX ADMIN — ESMOLOL HYDROCHLORIDE 30 MG: 10 INJECTION, SOLUTION INTRAVENOUS at 17:30

## 2019-12-27 RX ADMIN — SODIUM CHLORIDE, POTASSIUM CHLORIDE, SODIUM LACTATE AND CALCIUM CHLORIDE: 600; 310; 30; 20 INJECTION, SOLUTION INTRAVENOUS at 09:50

## 2019-12-27 RX ADMIN — ESMOLOL HYDROCHLORIDE 30 MG: 10 INJECTION, SOLUTION INTRAVENOUS at 17:20

## 2019-12-27 ASSESSMENT — MIFFLIN-ST. JEOR
SCORE: 1326.75
SCORE: 1323.75

## 2019-12-27 ASSESSMENT — VISUAL ACUITY
OU: NORMAL ACUITY

## 2019-12-27 ASSESSMENT — ACTIVITIES OF DAILY LIVING (ADL): ADLS_ACUITY_SCORE: 13

## 2019-12-27 NOTE — ANESTHESIA PROCEDURE NOTES
Arterial Line Procedure Note  Staff:     Anesthesiologist:  Francine Mott MD    Arterial line performed by resident/CRNA in presence of a teaching physician    Location: In OR Before Induction  Procedure Start/Stop Times:     patient identified, IV checked, site marked, risks and benefits discussed, informed consent, monitors and equipment checked, pre-op evaluation and at physician/surgeon's request      Correct Patient: Yes      Correct Position: Yes      Correct Site: Yes      Correct Procedure: Yes      Correct Laterality:  Yes    Site Marked:  Yes  Line Placement:     Procedure:  Arterial Line    Insertion Site:  Radial    Insertion laterality:  Left    Skin Prep: Chloraprep      Patient Prep: patient draped, mask, sterile gloves, hat and hand hygiene      Local skin infiltration:  1% lidocaine    Ultrasound Guided?: No      Catheter size:  20 gauge, 12 cm    Cath secured with: anchor securement device      Dressing:  Tegaderm and Occlusive Gauze    Complications:  None obvious    Arterial waveform: Yes

## 2019-12-27 NOTE — OR NURSING
Orders received from neurosurgery which included full set of labs and Type & Screen, paged neurosurgery to let them know patient had pre-op labs and T/S completed however, no return call from neurosurgery.

## 2019-12-27 NOTE — OR NURSING
No return page from neurosurgery. Lab orders discontinued, patient does not met anesthesia order set indications for new labs to be drawn.

## 2019-12-28 ENCOUNTER — APPOINTMENT (OUTPATIENT)
Dept: PHYSICAL THERAPY | Facility: CLINIC | Age: 49
DRG: 027 | End: 2019-12-28
Attending: STUDENT IN AN ORGANIZED HEALTH CARE EDUCATION/TRAINING PROGRAM
Payer: COMMERCIAL

## 2019-12-28 LAB
ALBUMIN SERPL-MCNC: 2.8 G/DL (ref 3.4–5)
ANION GAP SERPL CALCULATED.3IONS-SCNC: 4 MMOL/L (ref 3–14)
BUN SERPL-MCNC: 10 MG/DL (ref 7–30)
CALCIUM SERPL-MCNC: 7.2 MG/DL (ref 8.5–10.1)
CHLORIDE SERPL-SCNC: 112 MMOL/L (ref 94–109)
CO2 SERPL-SCNC: 28 MMOL/L (ref 20–32)
CREAT SERPL-MCNC: 0.61 MG/DL (ref 0.66–1.25)
ERYTHROCYTE [DISTWIDTH] IN BLOOD BY AUTOMATED COUNT: 13.1 % (ref 10–15)
GFR SERPL CREATININE-BSD FRML MDRD: >90 ML/MIN/{1.73_M2}
GLUCOSE BLDC GLUCOMTR-MCNC: 108 MG/DL (ref 70–99)
GLUCOSE BLDC GLUCOMTR-MCNC: 125 MG/DL (ref 70–99)
GLUCOSE BLDC GLUCOMTR-MCNC: 88 MG/DL (ref 70–99)
GLUCOSE BLDC GLUCOMTR-MCNC: 91 MG/DL (ref 70–99)
GLUCOSE SERPL-MCNC: 100 MG/DL (ref 70–99)
HBA1C MFR BLD: 5 % (ref 0–5.6)
HCT VFR BLD AUTO: 32 % (ref 40–53)
HGB BLD-MCNC: 10.5 G/DL (ref 13.3–17.7)
LACTATE BLD-SCNC: 1.6 MMOL/L (ref 0.7–2)
MAGNESIUM SERPL-MCNC: 1.6 MG/DL (ref 1.6–2.3)
MCH RBC QN AUTO: 30.5 PG (ref 26.5–33)
MCHC RBC AUTO-ENTMCNC: 32.8 G/DL (ref 31.5–36.5)
MCV RBC AUTO: 93 FL (ref 78–100)
MRSA DNA SPEC QL NAA+PROBE: NEGATIVE
PHOSPHATE SERPL-MCNC: 3 MG/DL (ref 2.5–4.5)
PLATELET # BLD AUTO: 141 10E9/L (ref 150–450)
POTASSIUM SERPL-SCNC: 3.6 MMOL/L (ref 3.4–5.3)
RBC # BLD AUTO: 3.44 10E12/L (ref 4.4–5.9)
SODIUM SERPL-SCNC: 144 MMOL/L (ref 133–144)
SPECIMEN SOURCE: NORMAL
WBC # BLD AUTO: 9.9 10E9/L (ref 4–11)

## 2019-12-28 PROCEDURE — 97530 THERAPEUTIC ACTIVITIES: CPT | Mod: GP

## 2019-12-28 PROCEDURE — 83735 ASSAY OF MAGNESIUM: CPT | Performed by: NEUROLOGICAL SURGERY

## 2019-12-28 PROCEDURE — 80069 RENAL FUNCTION PANEL: CPT | Performed by: NEUROLOGICAL SURGERY

## 2019-12-28 PROCEDURE — 97116 GAIT TRAINING THERAPY: CPT | Mod: GP

## 2019-12-28 PROCEDURE — 25000125 ZZHC RX 250: Performed by: STUDENT IN AN ORGANIZED HEALTH CARE EDUCATION/TRAINING PROGRAM

## 2019-12-28 PROCEDURE — 25000128 H RX IP 250 OP 636: Performed by: STUDENT IN AN ORGANIZED HEALTH CARE EDUCATION/TRAINING PROGRAM

## 2019-12-28 PROCEDURE — 20000004 ZZH R&B ICU UMMC

## 2019-12-28 PROCEDURE — 25000132 ZZH RX MED GY IP 250 OP 250 PS 637: Performed by: STUDENT IN AN ORGANIZED HEALTH CARE EDUCATION/TRAINING PROGRAM

## 2019-12-28 PROCEDURE — 00000146 ZZHCL STATISTIC GLUCOSE BY METER IP

## 2019-12-28 PROCEDURE — 85027 COMPLETE CBC AUTOMATED: CPT | Performed by: NEUROLOGICAL SURGERY

## 2019-12-28 PROCEDURE — 97162 PT EVAL MOD COMPLEX 30 MIN: CPT | Mod: GP

## 2019-12-28 PROCEDURE — 83036 HEMOGLOBIN GLYCOSYLATED A1C: CPT | Performed by: NEUROLOGICAL SURGERY

## 2019-12-28 PROCEDURE — 83605 ASSAY OF LACTIC ACID: CPT | Performed by: STUDENT IN AN ORGANIZED HEALTH CARE EDUCATION/TRAINING PROGRAM

## 2019-12-28 PROCEDURE — 25800030 ZZH RX IP 258 OP 636: Performed by: STUDENT IN AN ORGANIZED HEALTH CARE EDUCATION/TRAINING PROGRAM

## 2019-12-28 RX ADMIN — LABETALOL 20 MG/4 ML (5 MG/ML) INTRAVENOUS SYRINGE 20 MG: at 18:57

## 2019-12-28 RX ADMIN — MELATONIN 2000 UNITS: at 07:44

## 2019-12-28 RX ADMIN — LEVETIRACETAM 500 MG: 500 TABLET, FILM COATED ORAL at 07:44

## 2019-12-28 RX ADMIN — LABETALOL 20 MG/4 ML (5 MG/ML) INTRAVENOUS SYRINGE 20 MG: at 09:06

## 2019-12-28 RX ADMIN — SODIUM CHLORIDE 250 ML: 9 INJECTION, SOLUTION INTRAVENOUS at 04:55

## 2019-12-28 RX ADMIN — LEVETIRACETAM 500 MG: 500 TABLET, FILM COATED ORAL at 19:57

## 2019-12-28 RX ADMIN — ACETAMINOPHEN 975 MG: 325 TABLET, FILM COATED ORAL at 19:57

## 2019-12-28 RX ADMIN — ACETAMINOPHEN 975 MG: 325 TABLET, FILM COATED ORAL at 11:17

## 2019-12-28 RX ADMIN — Medication 1 CAPSULE: at 07:51

## 2019-12-28 RX ADMIN — LABETALOL 20 MG/4 ML (5 MG/ML) INTRAVENOUS SYRINGE 20 MG: at 19:14

## 2019-12-28 RX ADMIN — ACETAMINOPHEN 975 MG: 325 TABLET, FILM COATED ORAL at 03:16

## 2019-12-28 RX ADMIN — Medication 2 G: at 04:19

## 2019-12-28 RX ADMIN — POTASSIUM CHLORIDE 20 MEQ: 750 TABLET, EXTENDED RELEASE ORAL at 04:18

## 2019-12-28 RX ADMIN — HYDRALAZINE HYDROCHLORIDE 20 MG: 20 INJECTION INTRAMUSCULAR; INTRAVENOUS at 19:40

## 2019-12-28 ASSESSMENT — VISUAL ACUITY
OU: NORMAL ACUITY

## 2019-12-28 ASSESSMENT — MIFFLIN-ST. JEOR: SCORE: 1327.75

## 2019-12-28 ASSESSMENT — ACTIVITIES OF DAILY LIVING (ADL)
ADLS_ACUITY_SCORE: 15

## 2019-12-28 ASSESSMENT — PAIN DESCRIPTION - DESCRIPTORS
DESCRIPTORS: DISCOMFORT;SPASM
DESCRIPTORS: CONSTANT

## 2019-12-28 NOTE — PROGRESS NOTES
5F Right femoral arterial sheath removed. Hemostasis achieved with 15 minutes manual pressure hold. 6 hour bed rest.     Please call with any concerns,    Andreas Storm  Neuro Interventional fellow   [phone]

## 2019-12-28 NOTE — OR NURSING
Pt meeting phase one completion criteria @ 1830.  Alert and oriented times 4.  Neruo checks remain intact.  Rating pain in head as minimal.  Lungs cont equal and clear bi lat.  Tolerating wean to 2 liters nasal cannula.  Right scalp dressing remain c/d/i.  Right groin sheath also remain intact with pressure tubing bag attached.  CMST checks also remain intact to right foot.  SBP now <100 as per goal.           Auto Held medications in EPIC EMAR were communicated to receiving unit RN.    Blood glucose levels reported; next blood glucose check due upon arrival to unit   .    SBAR Hand off report to 4A RN as noted.        Neuro surgery resident Jeri will address pulling sheath.

## 2019-12-28 NOTE — PROGRESS NOTES
"Neurosurgery Progress Note    Subjective:  S/p OR for AVM resection, doing well post-op    Objective:  BP 98/65 (BP Location: Left arm)   Pulse 85   Temp 99.6  F (37.6  C) (Oral)   Resp 15   Ht 1.676 m (5' 6\")   Wt 53.3 kg (117 lb 8.1 oz)   SpO2 97%   BMI 18.97 kg/m    Alert, oriented x3  CN 2-12 intact  5/5 strength in all extremities, no pronator drift  Sensation intact to light touch      Assessment:  Mr. Recions is a 48 yo M with [facility] AVM s/p OR on 12/27 for Stealth assisted right parietal craniotomy and resection of arteriovenous malformation, intraoperative cerebral angiography. Doing well post-op.    Plan:  < 120 for 24 hrs -> then < 140; nicardipine prn  Keppra 7 days  PT/OT  Advance diet as tolerated    Masood Wilcox MD  Neurosurgery Resident, PGY-1    Please contact neurosurgery resident on call with questions.    Dial * * [phone], enter [phone] when prompted.     I have reviewed the history. I have seen and examined the patient myself and agree with the assessment and plan above.  EMANUEL Whitney MD    "

## 2019-12-28 NOTE — OR NURSING
Pt arrived to PACU.  PACU Rn assumed care of pt @ 1745.  Pt arrived able to state name and deny pain or nausea at present time.  Lungs equal and clear bilat; tolerating nasal cannula.  Right head dressing c/d/i - pt HOB flat due to right groin sheath in place at present time.  Pt in reverse trendelenburg due to needing HOB flat for sheath safety -but also having HOB 30 degree order per Neuor service.    Jennie resident Masood Wilcox to bedside to assess pt @ 1800 - per MD pt to have HOB flat until sheath pulled and HOB restrictions completed.  Right groin sheath site intact - pulses to right for palpable - CMST check to foot also intact.  PT arrived to PACU with  -140's CRNA gave dose of Esmolol - will monitor blood pressure and heart rate for effect.  Per neuro surgery orders SBP goal <100.    Neuro assessment appears intact.   --  Additional assessment as per flowsheet.

## 2019-12-28 NOTE — PROGRESS NOTES
"Neurosurgery Progress Note    Subjective:  S/p OR for AVM resection, doing well post-op    Objective:  /75 (BP Location: Left arm)   Pulse 88   Temp 97.8  F (36.6  C) (Oral)   Resp 14   Ht 1.676 m (5' 6\")   Wt 52.9 kg (116 lb 10 oz)   SpO2 99%   BMI 18.82 kg/m    Alert, oriented x3  CN 2-12 intact  5/5 strength in all extremities, no pronator drift  Sensation intact to light touch      Assessment:  Mr. Recinos is a 50 yo M with PMH AVM s/p OR on 12/27 for Stealth assisted right parietal craniotomy and resection of arteriovenous malformation, intraoperative cerebral angiography. Doing well post-op.    Plan:  SBP < 100 for 24 hrs -> then < 120 for 24 hrs -> then < 140; nicardipine prn  Keppra 7 days  PT/OT  Advance diet as tolerated      Yovany Weinstein MD  Neurosurgery Resident PGY2    Please contact neurosurgery resident on call with questions.    Dial * * [phone], enter [phone] when prompted.     I have reviewed the history. I have seen and examined the patient myself and agree with the assessment and plan above.  EMANUEL Whitney MD      "

## 2019-12-28 NOTE — PLAN OF CARE
4A Discharge Planner PT   Patient plan for discharge: home with assist from SO prn   Current status: PT evaluation initiated and completed. Treatment indicated post op craniotomy. VSS on RA. Educated pt and SO present at bedside on craniotomy precautions. Supine > EOB, SBA-CGA for line management. EOB > recliner, SBA with no AE. Initiated and progressed gait to OOR with CGA-SBA for very mild balance deficits. Pt maintained neutral head positioning throughout majority of session though encouraged to complete gentle cervical ROM to prevent stiffening of neck musculature. Recommend nursing encourage SBA transfers from EOB <> recliner 3x/day, in room and OOR short gait distances.     Barriers to return to prior living situation: medical status, balance, stairs at home  Recommendations for discharge: anticipate home with assist prn from SO + may need further follow up skilled therapy dependent on IP PT progression  Rationale for recommendations: Pt is mobilizing well post op where PT anticipates that pt will progress well IP and be able to discharge home when medically stable. Pt has mild balance deficits therefore, may need OP PT - will continue to follow and update as able. Pt has a split-level entry at home - PT will need to assess safe stairs negotiation prior to discharge.         Entered by: Estefania Malave 12/28/2019 11:46 AM

## 2019-12-28 NOTE — PLAN OF CARE
Neuros unchange, intact. Denies pain/headache/n/v. Follows all command. Afebrile, sbp remains wnl, 20mg of labetalol given once this morning. Room air. Reg diet, ate once today only, needed encouragement and attempted multiple times to get pt to eat. Pt ate all of his chicken noodle soup and drank all of his orange juice. [place] out this evening around 1500; due to void. Pt refused his morning and afternoon stool softeners; reported last bm was yesterday and did not want any today. Pt ambulated in [address] way this morning with gait belt and minimal assist. Up to chair once this morning. Pt stable at this time. Neuros q1hr until 2200 tonight then q2hr per protocol. Notify MD for any changes or concern.

## 2019-12-28 NOTE — OR NURSING
IR Fellow to bedside to remove right groin sheath @ 1900 .  Post removal right groin site intact with Tegaderm dressing c/d/i.  Pt to maintain bedrest times 6 hours - HOB flat for 1 hour than may have HOB at 30 degrees after 1 hour.  This information given to 4A RN as noted.

## 2019-12-28 NOTE — PROGRESS NOTES
"   12/28/19 1223   Quick Adds   Type of Visit Initial PT Evaluation   Living Environment   Lives With spouse;child(colby), adult   Living Arrangements house   Home Accessibility stairs to enter home   Number of Stairs, Main Entrance 8   Stair Railings, Main Entrance railing on left side (ascending)   Transportation Anticipated car, drives self;family or friend will provide   Living Environment Comment Pt lives in a spilt level home with his wife \"Linda\" and 2 adult children (16 and 19 y/o). Pt reports that he was able to drive prior to admission/surgery. SO works though is able to assist as needed.   Self-Care   Usual Activity Tolerance good   Current Activity Tolerance moderate   Regular Exercise No   Equipment Currently Used at Home none   Activity/Exercise/Self-Care Comment Pt was previously IND with self cares. Pt work FT and plans to return to work.   Functional Level Prior   Ambulation 0-->independent   Transferring 0-->independent   Toileting 0-->independent   Bathing 0-->independent   Communication 0-->understands/communicates without difficulty   Cognition 0 - no cognition issues reported   Fall history within last six months no   Which of the above functional risks had a recent onset or change? ambulation   Prior Functional Level Comment Pt was previously IND with functional mobility and ADLs.    General Information   Onset of Illness/Injury or Date of Surgery - Date 12/27/19   Referring Physician Masood Wilcox MD   Patient/Family Goals Statement Return home and to work.   Pertinent History of Current Problem (include personal factors and/or comorbidities that impact the POC) Per chart: \"50 yo M with PMH AVM s/p OR on 12/27 for Stealth assisted right parietal craniotomy and resection of arteriovenous malformation, intraoperative cerebral angiography.\"   Precautions/Limitations other (see comments)  (Craniotomy precautions)   General Observations Pt is up SBA-CGA.   General Info Comments " Activity: ambulate with assist   Cognitive Status Examination   Orientation orientation to person, place and time   Level of Consciousness alert   Follows Commands and Answers Questions 100% of the time   Personal Safety and Judgment intact   Memory intact   Integumentary/Edema   Integumentary/Edema Comments R crani incision covered with bandage   Posture    Posture Protracted shoulders   Range of Motion (ROM)   ROM Comment WFL   Strength   Strength Comments WFL; grossly > 3/5 for B LEs and  strength   Bed Mobility   Bed Mobility Comments SBA-CGA   Transfer Skills   Transfer Comments SBA-CGA: 1 x min A for balance   Gait   Gait Comments SBA-CGA with no AE   Balance   Balance Comments Higher level balance impaired   Sensory Examination   Sensory Perception Comments No changes in sensation reported   General Therapy Interventions   Planned Therapy Interventions balance training;bed mobility training;gait training;neuromuscular re-education;strengthening;transfer training;risk factor education;home program guidelines;progressive activity/exercise   Clinical Impression   Criteria for Skilled Therapeutic Intervention yes, treatment indicated   PT Diagnosis Impaired functional mobility post op   Influenced by the following impairments decreased balance, craniotomy precautions   Functional limitations due to impairments Limited activity tolerance, assistance with lines and dynamic balance   Clinical Presentation Stable/Uncomplicated   Clinical Presentation Rationale clinical judgement and chart review   Clinical Decision Making (Complexity) Moderate complexity   Therapy Frequency 5x/week   Predicted Duration of Therapy Intervention (days/wks) < 1 week   Anticipated Equipment Needs at Discharge other (see comments)  (TBD)   Anticipated Discharge Disposition Home with Assist;Home with Outpatient Therapy   Risk & Benefits of therapy have been explained Yes   Patient, Family & other staff in agreement with plan of care Yes  "  [facility] TM \"6 Clicks\"   2016, Trustees of [employer], under license to [employer].  All rights reserved.   6 Clicks Short Forms Basic Mobility Inpatient Short Form   [facility]  \"6 Clicks\" V.2 Basic Mobility Inpatient Short Form   1. Turning from your back to your side while in a flat bed without using bedrails? 3 - A Little   2. Moving from lying on your back to sitting on the side of a flat bed without using bedrails? 3 - A Little   3. Moving to and from a bed to a chair (including a wheelchair)? 3 - A Little   4. Standing up from a chair using your arms (e.g., wheelchair, or bedside chair)? 3 - A Little   5. To walk in hospital room? 3 - A Little   6. Climbing 3-5 steps with a railing? 3 - A Little   Basic Mobility Raw Score (Score out of 24.Lower scores equate to lower levels of function) 18   Total Evaluation Time   Total Evaluation Time (Minutes) 9     "

## 2019-12-28 NOTE — PROGRESS NOTES
Admitted/transferred from: PACU  Reason for admission/transfer: s/p craniotomy for resection of AVM  2 RN skin assessment: completed by Sakina HARRIS and Stiven JUNE   Result of skin assessment and interventions/actions: No skin issues, preventive mepilex to bottom  Height, weight, drug calc weight: Done  Patient belongings (see Flowsheet)  MDRO education added to care plan: N/A  ?

## 2019-12-28 NOTE — BRIEF OP NOTE
[facility], [place]    Brief Operative Note    Pre-operative diagnosis: Arteriovenous malformation, cerebral [Q28.2]  Post-operative diagnosis Same as pre-operative diagnosis    Procedure: Procedure(s):  Stealth assisted right parietal craniotomy and resection of arteriovenous malformation, intraoperative cerebral angiography  Surgeon: Surgeon(s) and Role:     * Carmen Whitney MD - Primary     * Je Lee MD - Resident - Assisting  Anesthesia: General   Estimated blood loss: 200 ml  Drains: None  Specimens:   ID Type Source Tests Collected by Time Destination   A : Right Parietal AVM Tissue Brain SURGICAL PATHOLOGY EXAM Carmen Whitney MD 12/27/2019  3:29 PM      Findings:   procedure as stated.  Complications: None.  Implants:   Implant Name Type Inv. Item Serial No.  Lot No. LRB No. Used   IMP PLATE SYN BOX LOW PROFILE 04H .511 Metallic Hardware/[employer] IMP PLATE SYN BOX LOW PROFILE 04H .511 NONE SYNTHES-STRATEC NONE Right 3   IMP SCR SYN MATRIX LOW PRO 1.5X04MM SELF DRILL [id number] Metallic Hardware/[employer] IMP SCR SYN MATRIX LOW PRO 1.5X04MM SELF DRILL [id number] NONE SYNTHES-STRATEC NONE Right 17   IMP BUR HOLE COVER 17MM LOW PROFILE .527 Metallic Hardware/[employer] IMP BUR HOLE COVER 17MM LOW PROFILE .527 NONE SYNTHES-STRATEC NONE Right 1   CLIP SUGITA AVM 3MM CVD -03 Clip CLIP SUGITA AVM 3MM CVD -03 NA [employer]  Right 1   CLIP SUGITA AVM 4MM CVD -04 Clip CLIP SUGITA AVM 4MM CVD -04 NA [employer]  Right 1   GRAFT DURAGEN 3X3&quot; ID-[id number] Bone/Tissue/Biologic GRAFT DURAGEN 3X3&quot; [id number] [employer] [id number] Right 1     Yovany Weinstein MD  Neurosurgery Resident PGY2    Please contact neurosurgery resident on call with questions.    Dial * * [phone], enter [phone] when prompted.

## 2019-12-28 NOTE — OR NURSING
"\" Grisel please place Brief op note on pt so we can transfer to unit.   thanks leonela [phone]\"  This text message sent to Neuro surgery resident Jeri COLLAZO @ [phone].   "

## 2019-12-28 NOTE — ANESTHESIA POSTPROCEDURE EVALUATION
Anesthesia POST Procedure Evaluation    Patient: Lizz Recinos   MRN:     [id number] Gender:   male   Age:    49 year old :      1970        Preoperative Diagnosis: Arteriovenous malformation, cerebral [Q28.2]   Procedure(s):  Stealth assisted right parietal craniotomy and resection of arteriovenous malformation, intraoperative cerebral angiography   Postop Comments: No value filed.       Anesthesia Type:  Not documented  General    Reportable Event: NO     PAIN: Uncomplicated   Sign Out status: Comfortable, Well controlled pain     PONV: No PONV   Sign Out status:  No Nausea or Vomiting     Neuro/Psych: Uneventful perioperative course   Sign Out Status: Preoperative baseline; Age appropriate mentation     Airway/Resp.: Uneventful perioperative course   Sign Out Status: Airway Device present     CV: Uneventful perioperative course   Sign Out status: Appropriate BP and perfusion indices; Appropriate HR/Rhythm     Disposition:   Sign Out in:  PACU  Disposition:  Phase II; Home  Recovery Course: Uneventful  Follow-Up: Not required           Last Anesthesia Record Vitals:  CRNA VITALS  2019 1654 - 2019 1754      2019             Resp Rate (observed):  14    EKG:  Sinus rhythm          Last PACU Vitals:  Vitals Value Taken Time   /67 2019  6:15 PM   Temp 36.4  C (97.5  F) 2019  5:45 PM   Pulse 89 2019  6:15 PM   Resp 16 2019  6:15 PM   SpO2 100 % 2019  6:29 PM   Temp src Available 2019  5:30 PM   NIBP     Pulse     SpO2     Resp     Temp     Ht Rate     Temp 2     Vitals shown include unvalidated device data.      Electronically Signed By: Fiordaliza Lopez MD, 2019, 6:30 PM

## 2019-12-28 NOTE — OP NOTE
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      TITLE OF PROCEDURES:   1.  Right parietal craniotomy.   2.  Resection of arteriovenous malformation, complex.   3.  Intraoperative use of microscope.   4.  Intraoperative use of frameless stereotactic neuronavigation.      ANESTHESIA:  GETA.      ATTENDING SURGEON:  Carmen Whitney MD      RESIDENT SURGEON:  Je Lee MD      HISTORY OF PRESENT ILLNESS:  Mr. 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  Cerebral angiography confirmed that this unruptured AVM was in the right angular gyrus and was supplied mainly by right middle cerebral artery branches with multiple superficial draining veins.  Based on his age and risk for rupture, we determined that resection of the AVM was the best treatment.  He presents for the above stated procedure.      DESCRIPTION OF PROCEDURE:  Upon intubation and induction of general anesthesia, the patient was placed in supine position on the angiography table.  The Garcia head de oliveira was applied.  A gel roll was placed under his right shoulder and his head was turned to the left.  After securing his head in this position, the scalp fiducials were registered and frameless stereotactic neuronavigation was established with the Stealth system.  Electrodes for SSEP and EEG monitoring were established by the [employer] staff and baseline recordings were obtained.  Using the neuronavigation, we planned a linear preauricular incision on the right side that was centered over this AVM.  His scalp was prepared and draped in the usual sterile fashion.  The Neuro-interventional team placed a 5-Spanish sheath in the right common femoral artery for  intraoperative cerebral angiography.  The planned incision was infiltrated with 0.5% bupivacaine with epinephrine.  A timeout was performed.      The incision was carried down to the pericranium and the temporalis fascia.  Temporalis fascia and muscle were divided with monopolar cautery.  The wound edges were retracted using fishhooks. We planned the craniotomy using the neuronavigation so that there were good margins around the AVM nidus.  Bur holes were placed using the .  A parietal craniotomy was completed using the high-speed cutting drill.  The bone flap was elevated and set aside.  We checked the neuronavigation  prior to opening the dura and it appeared the nidus was in the center of the field.  We opened the dura in a curvilinear fashion so that it could be flapped with the base along the inferior edge of the craniotomy.  We found the dura to be densely adherent to the varix along the superior-posterior surface of the AVM.  The operative microscope was draped and brought into the field.      Under high magnification, we took down the adhesions between the varix and the dura.  Once we retracted the dural flap superiorly, we were able to see the nidus along the angular gyrus surface.  We then completed extensive arachnoid dissection around the AVM.  We started with identifying the angular artery as it emerged from the sylvian fissure.  We followed this artery towards the nidus.  There were dense arachnoid adhesions which we took down using the Hunters blade and microscissors.  We dissected around the varix, which was positioned along the superior and posterior surface of the nidus along the posterior and superior and posterior surface.  We identified the other 3 draining veins emerging from the nidus.  The angular artery was enlarged and there was a bifurcation point and the anterior branch supplied the nidus while the posterior branch did not appear to be contributing.  We attempted to  de-arterialize the AVM by cauterizing and dividing branches off the angular artery.  However, the AVM nidus was tense and we experienced brisk bleeding from the nidus on a few occasions.  These bleeding points were controlled with bipolar cautery and Sugita AVM clips.  We applied a temporary aneurysm clip to the angular artery and this deflated the nidus considerably.  We were then able to skeletonize the anterior branch of the angular artery.  We cauterized and transected the feeding branches off of this artery.  We knew that the distal end of this anterior branch continued beyond the nidus to supply the parietal cortex.  Therefore, preservation of this artery was a priority.  By de-branching this artery, the draining veins of the AVM began to change color.  The varix began to turn purple and lost its turgor.  We then removed the aneurysm clip from the angular branch and there was no breakthrough bleeding from the AVM.  We continued circumferential dissection and took down arterial feeders to the nidus.  We identified the apex of the conical nidus and then delivered the AVM from its cavity.  We transected the venous connections, which had all turned purple previously.  This specimen was sent off for permanent pathology.      The patient's blood pressure was then raised into the systolic 140s for about 15 minutes and there was no breakthrough bleeding.  His blood pressure was then brought back down under systolic 100.  Intraoperative cerebral angiography was performed by the Neuro-Interventional team.  Please see separate dictated report for details.  The angiogram did not show any residual AVM and there was preservation of the cortical branches of the angular artery.  There were no focal changes in SSEP or EEG monitoring throughout the operation. The wound was irrigated and bleeding points were secured.  The AVM resection cavity was lined with Surgicel.  The dura was reapproximated with interrupted 4-0 Nurolon.  A  layer of Duragen was applied in an onlay fashion.  The bone flap was replaced and secured with Synthes mini plates and screws.  Temporalis muscle and fascia was reapproximated with interrupted 2-0 Vicryl.  The scalp was then closed in 2 layers.  A sterile dressing was applied.  The head de oliveira was removed.  The patient was extubated and transported to the recovery room without incident.  Blood loss was approximately 200 mL.  Sponge and needle counts were correct at the end of the case.  I was present for the key portions and immediately available for the entire operation.  Please note that preservation of the angular artery during resection of this AVM added complexity to the AVM resection reflected in an extra 20% time and effort to the operation.         ANNMARIE NERI MD             D: 2019   T: 2019   MT: ISHAN      Name:     SONYA HIDALGO   MRN:      [id number]        Account:        [id number]   :      1970           Procedure Date: 2019      Document: [id number]

## 2019-12-29 LAB
ALBUMIN SERPL-MCNC: 2.7 G/DL (ref 3.4–5)
ANION GAP SERPL CALCULATED.3IONS-SCNC: 4 MMOL/L (ref 3–14)
BUN SERPL-MCNC: 10 MG/DL (ref 7–30)
CALCIUM SERPL-MCNC: 7.4 MG/DL (ref 8.5–10.1)
CHLORIDE SERPL-SCNC: 110 MMOL/L (ref 94–109)
CO2 SERPL-SCNC: 27 MMOL/L (ref 20–32)
CREAT SERPL-MCNC: 0.64 MG/DL (ref 0.66–1.25)
ERYTHROCYTE [DISTWIDTH] IN BLOOD BY AUTOMATED COUNT: 13.3 % (ref 10–15)
GFR SERPL CREATININE-BSD FRML MDRD: >90 ML/MIN/{1.73_M2}
GLUCOSE BLDC GLUCOMTR-MCNC: 83 MG/DL (ref 70–99)
GLUCOSE SERPL-MCNC: 92 MG/DL (ref 70–99)
HCT VFR BLD AUTO: 32.4 % (ref 40–53)
HGB BLD-MCNC: 10.4 G/DL (ref 13.3–17.7)
MAGNESIUM SERPL-MCNC: 2 MG/DL (ref 1.6–2.3)
MCH RBC QN AUTO: 30.9 PG (ref 26.5–33)
MCHC RBC AUTO-ENTMCNC: 32.1 G/DL (ref 31.5–36.5)
MCV RBC AUTO: 96 FL (ref 78–100)
PHOSPHATE SERPL-MCNC: 2.3 MG/DL (ref 2.5–4.5)
PLATELET # BLD AUTO: 140 10E9/L (ref 150–450)
POTASSIUM SERPL-SCNC: 3.7 MMOL/L (ref 3.4–5.3)
RBC # BLD AUTO: 3.37 10E12/L (ref 4.4–5.9)
SODIUM SERPL-SCNC: 141 MMOL/L (ref 133–144)
WBC # BLD AUTO: 8.2 10E9/L (ref 4–11)

## 2019-12-29 PROCEDURE — 00000146 ZZHCL STATISTIC GLUCOSE BY METER IP

## 2019-12-29 PROCEDURE — 83735 ASSAY OF MAGNESIUM: CPT | Performed by: NEUROLOGICAL SURGERY

## 2019-12-29 PROCEDURE — 12000001 ZZH R&B MED SURG/OB UMMC

## 2019-12-29 PROCEDURE — 85027 COMPLETE CBC AUTOMATED: CPT | Performed by: NEUROLOGICAL SURGERY

## 2019-12-29 PROCEDURE — 25000132 ZZH RX MED GY IP 250 OP 250 PS 637: Performed by: STUDENT IN AN ORGANIZED HEALTH CARE EDUCATION/TRAINING PROGRAM

## 2019-12-29 PROCEDURE — 25800030 ZZH RX IP 258 OP 636: Performed by: STUDENT IN AN ORGANIZED HEALTH CARE EDUCATION/TRAINING PROGRAM

## 2019-12-29 PROCEDURE — 80069 RENAL FUNCTION PANEL: CPT | Performed by: NEUROLOGICAL SURGERY

## 2019-12-29 PROCEDURE — 25000125 ZZHC RX 250: Performed by: STUDENT IN AN ORGANIZED HEALTH CARE EDUCATION/TRAINING PROGRAM

## 2019-12-29 PROCEDURE — 36415 COLL VENOUS BLD VENIPUNCTURE: CPT | Performed by: NEUROLOGICAL SURGERY

## 2019-12-29 RX ORDER — AMOXICILLIN 250 MG
3 CAPSULE ORAL AT BEDTIME
Status: DISCONTINUED | OUTPATIENT
Start: 2019-12-29 | End: 2019-12-30 | Stop reason: HOSPADM

## 2019-12-29 RX ORDER — LABETALOL 20 MG/4 ML (5 MG/ML) INTRAVENOUS SYRINGE
10-40 EVERY 10 MIN PRN
Status: CANCELLED | OUTPATIENT
Start: 2019-12-29

## 2019-12-29 RX ORDER — HYDRALAZINE HYDROCHLORIDE 20 MG/ML
10-20 INJECTION INTRAMUSCULAR; INTRAVENOUS EVERY 30 MIN PRN
Status: CANCELLED | OUTPATIENT
Start: 2019-12-29

## 2019-12-29 RX ADMIN — SENNOSIDES AND DOCUSATE SODIUM 1 TABLET: 8.6; 5 TABLET ORAL at 08:16

## 2019-12-29 RX ADMIN — LEVETIRACETAM 500 MG: 500 TABLET, FILM COATED ORAL at 19:14

## 2019-12-29 RX ADMIN — MELATONIN 2000 UNITS: at 08:16

## 2019-12-29 RX ADMIN — ACETAMINOPHEN 975 MG: 325 TABLET, FILM COATED ORAL at 19:13

## 2019-12-29 RX ADMIN — POTASSIUM CHLORIDE 20 MEQ: 750 TABLET, EXTENDED RELEASE ORAL at 06:35

## 2019-12-29 RX ADMIN — Medication 2 G: at 06:36

## 2019-12-29 RX ADMIN — SENNOSIDES AND DOCUSATE SODIUM 3 TABLET: 8.6; 5 TABLET ORAL at 22:24

## 2019-12-29 RX ADMIN — SODIUM CHLORIDE: 9 INJECTION, SOLUTION INTRAVENOUS at 02:24

## 2019-12-29 RX ADMIN — ACETAMINOPHEN 975 MG: 325 TABLET, FILM COATED ORAL at 03:46

## 2019-12-29 RX ADMIN — LEVETIRACETAM 500 MG: 500 TABLET, FILM COATED ORAL at 08:16

## 2019-12-29 RX ADMIN — Medication 1 CAPSULE: at 08:17

## 2019-12-29 RX ADMIN — ACETAMINOPHEN 975 MG: 325 TABLET, FILM COATED ORAL at 11:30

## 2019-12-29 ASSESSMENT — VISUAL ACUITY
OU: NORMAL ACUITY

## 2019-12-29 ASSESSMENT — ACTIVITIES OF DAILY LIVING (ADL)
ADLS_ACUITY_SCORE: 15

## 2019-12-29 ASSESSMENT — MIFFLIN-ST. JEOR
SCORE: 1330.75
SCORE: 1339.34

## 2019-12-29 NOTE — PLAN OF CARE
Neuro: Intact. AOx4. Follows commands. Strong in all extremities.   CV: SBP goal <120. SBP ~100s. SR, 80s-90s. T max of 99.6.   Resp: Room air. Lungs are clear  GI/: Voids in urinal. Active bowel sounds. No BM.   Skin: R groin site old drainage, no hemotoma. R head dressing, CDI. Everything else is intact  Plan: Transfer to floor either today or tomorrow. Continue to monitor BP.

## 2019-12-29 NOTE — PLAN OF CARE
Pt neuros intact/unchange. Denies any headache, pain, and n/v. Ambulated in hallway once and ambulating to and from bathroom in room several times. Afebrile. 's, no prn needed this shift to keep sbp <120. Room air, lung sounds clear/dim. Reg diet. Appetite is getting better, does still needs encouragement to eat but once ordered pt does finish all of food. Pt ate all of breakfast and lunch. No BM this shift. Gave senna this morning. Pt is unsure if he is passing gas. His bowel sounds are active. Gave report this evening. Pt will be transferring to  shortly. All medications and chart will be sent up. Wife is present at bed side. Continue POC, encourage PO intake and ambulation. Notify MD for any changes or concern.

## 2019-12-30 ENCOUNTER — APPOINTMENT (OUTPATIENT)
Dept: PHYSICAL THERAPY | Facility: CLINIC | Age: 49
DRG: 027 | End: 2019-12-30
Attending: NEUROLOGICAL SURGERY
Payer: COMMERCIAL

## 2019-12-30 VITALS
OXYGEN SATURATION: 98 % | HEIGHT: 66 IN | RESPIRATION RATE: 18 BRPM | TEMPERATURE: 97.7 F | BODY MASS INDEX: 19.19 KG/M2 | WEIGHT: 119.4 LBS | HEART RATE: 85 BPM | DIASTOLIC BLOOD PRESSURE: 70 MMHG | SYSTOLIC BLOOD PRESSURE: 117 MMHG

## 2019-12-30 PROCEDURE — 25000132 ZZH RX MED GY IP 250 OP 250 PS 637: Performed by: STUDENT IN AN ORGANIZED HEALTH CARE EDUCATION/TRAINING PROGRAM

## 2019-12-30 PROCEDURE — 97116 GAIT TRAINING THERAPY: CPT | Mod: GP

## 2019-12-30 PROCEDURE — 97530 THERAPEUTIC ACTIVITIES: CPT | Mod: GP

## 2019-12-30 RX ORDER — LEVETIRACETAM 500 MG/1
500 TABLET ORAL 2 TIMES DAILY
Qty: 8 TABLET | Refills: 0 | Status: SHIPPED | OUTPATIENT
Start: 2019-12-30 | End: 2020-01-06

## 2019-12-30 RX ADMIN — MELATONIN 2000 UNITS: at 08:53

## 2019-12-30 RX ADMIN — LEVETIRACETAM 500 MG: 500 TABLET, FILM COATED ORAL at 08:53

## 2019-12-30 RX ADMIN — ACETAMINOPHEN 975 MG: 325 TABLET, FILM COATED ORAL at 03:29

## 2019-12-30 RX ADMIN — Medication 1 CAPSULE: at 08:53

## 2019-12-30 ASSESSMENT — VISUAL ACUITY
OU: NORMAL ACUITY
OU: NORMAL ACUITY

## 2019-12-30 ASSESSMENT — ACTIVITIES OF DAILY LIVING (ADL)
ADLS_ACUITY_SCORE: 15

## 2019-12-30 NOTE — PROGRESS NOTES
"Neurosurgery Progress Note    Subjective:  Transferred to , doing well    Objective:  /64 (BP Location: Left arm)   Pulse 85   Temp 97.5  F (36.4  C) (Oral)   Resp 16   Ht 1.676 m (5' 6\")   Wt 54.2 kg (119 lb 6.4 oz)   SpO2 99%   BMI 19.27 kg/m    Alert, oriented x3  CN 2-12 intact  5/5 strength in all extremities, no pronator drift  Sensation intact to light touch      Assessment:  Mr. Recinos is a 50 yo M with [facility] AVM s/p OR on 12/27 for Stealth assisted right parietal craniotomy and resection of arteriovenous malformation, intraoperative cerebral angiography. Doing well post-op. Medically ready for discharge.    Plan:  SBP < 140  Keppra 7 days  PT/OT: home with assist  Advance diet as tolerated  Likely discharge home 12/30    Yovany Weinstein MD  Neurosurgery Resident PGY2    Please contact neurosurgery resident on call with questions.    Dial * * [phone], enter [phone] when prompted.     I have reviewed the history above and agree with the resident's assessment and plan.  EMANUEL Whitney MD      "

## 2019-12-30 NOTE — PLAN OF CARE
VSS. Denies pain. Neurologically intact. Head dressing removed, sutures intact. Good po intake. Voiding, had BM today. Up indep in room and halls. Worked with PT. Discharging home at this time. Medications and instructions gone over and questions answered. RN took pt to pharmacy and front door, family provided transport.

## 2019-12-30 NOTE — PLAN OF CARE
Discharge Planner PT   Patient plan for discharge: home  Current status: VSS on RA, denies dizziness/lightheadedness in session. Pt up SBA-IND, steady w/o device for 250'. Pt demos stairs per home set up IND. Issued craniotomy precautions handout for reference, good understanding.   Barriers to return to prior living situation: medical  Recommendations for discharge: home  Rationale for recommendations: Pt mobilizing well, will be able to discharge home when medically ready.     Physical Therapy Discharge Summary    Reason for therapy discharge:    Discharged to home.    Progress towards therapy goal(s). See goals on Care Plan in [facility] electronic health record for goal details.  Goals partially met.  Barriers to achieving goals:   discharge from facility.    Therapy recommendation(s):    Continue home exercise program.           Entered by: Miryam Reno 12/30/2019 10:36 AM

## 2019-12-30 NOTE — DISCHARGE SUMMARY
[facility] Discharge Summary and Instructions    Lziz Recinos MRN# [id number]   Age: 49 year old YOB: 1970     Date of Admission:  12/27/2019  Date of Discharge::  12/30/2019  Admitting Physician:  Carmen Whitney MD  Discharge Physician:  Carmen Whitney MD          Admission Diagnoses:   Arteriovenous malformation, cerebral [Q28.2]          Discharge Diagnosis:     Arteriovenous malformation, cerebral [Q28.2]          Procedures:   12/27/2019  1.  Right parietal craniotomy.   2.  Resection of arteriovenous malformation, complex.   3.  Intraoperative use of microscope.   4.  Intraoperative use of frameless stereotactic neuronavigation.            Brief History of Illness:   Mr. 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  Cerebral angiography confirmed that this unruptured AVM was in the right angular gyrus and was supplied mainly by right middle cerebral artery branches with multiple superficial draining veins.  Based on his age and risk for rupture, we determined that resection of the AVM was the best treatment.  He presents for the above stated procedure.   Patient was monitored closely to ensure that his systolic blood pressure remained less than 140.  On postoperative day #3 the patient was doing well and was discharged home with family.  Prior to discharge the patient denied pain, tolerating diet, voiding, passing bowel movements and mobilizing independently.  The patient will follow-up in 2 weeks for wound check.  The patient should also follow-up with his primary care provider for blood pressure check and potential medication alterations in order to keep systolic blood pressure less than 140.           Hospital Course:     Postoperatively the patient was transferred to the neurosurgical ICU where he remained neurologically and medically  "stable.  No postoperative imaging was obtained for an intraoperative angiogram was performed revealing no residual.  Patient was evaluated by physical and Occupational Therapy on postoperative day #1 and it was felt that the patient would be safe to return home with family.         Discharge Medications:     Current Discharge Medication List      START taking these medications    Details   levETIRAcetam (KEPPRA) 500 MG tablet 1 tablet (500 mg) by Oral or NG Tube route 2 times daily for 4 days  Qty: 8 tablet, Refills: 0    Associated Diagnoses: Arteriovenous malformation, cerebral         CONTINUE these medications which have NOT CHANGED    Details   acetaminophen (TYLENOL) 500 MG tablet Take 500 mg by mouth every 6 hours as needed for mild pain      Multiple Vitamin (MULTIVITAMINS PO) Take 2 tablets by mouth every morning       Vitamin D, Cholecalciferol, 25 MCG (1000 UT) TABS Take 2,000 Units by mouth every morning            Exam:   Physical Exam  /70 (BP Location: Left arm)   Pulse 85   Temp 97.7  F (36.5  C) (Oral)   Resp 18   Ht 1.676 m (5' 6\")   Wt 54.2 kg (119 lb 6.4 oz)   SpO2 98%   BMI 19.27 kg/m    General: Appears comfortable, NAD  Wound: Incision, clean, dry, intact without strikethrough  Neurologic Exam:  - AOx3.  - Follows commands.  - Speech fluent, spontaneous. No aphasia or dysarthria.  - No gaze preference. No apparent hemineglect.  - PERRL, EOMI.  - Face symmetric with sensation intact to light touch.  - Palate elevates symmetrically, uvula midline, tongue protrudes midline.  - Trapezii and sternocleidomastoid muscles 5/5 bilaterally.  - No pronator drift.  Motor: Normal bulk/tone; no tremor, rigidity, or bradykinesia.   Right:  Deltoid 5/5, tricep 5/5, bicep 5/5, wrist flexor 5/5, wrist extensor 5/5, finger intrinsic 5/5  Left:  Deltoid 5/5, tricep 5/5, bicep 5/5, wrist flexor 5/5, wrist extensor 5/5, finger intrinsic 5/5  Right: Iliopsoas 5/5, quadricep 5/5, hamstring 5/5, tibialis " anterior 5/5, gastrocnemius 5/5, EHL 5/5  Left:  Iliopsoas 5/5, quadricep 5/5, hamstring 5/5, tibialis anterior 5/5, gastrocnemius 5/5, EHL 5/5    Sensation intact in bilateral L4-S1 dermatones                Discharge Instructions and Follow-Up:     Discharge diet: Regular   Discharge activity: You may advance activity as tolerated. No strenuous exercise or heay lifting greater than 10 lbs for 4 weeks or until seen and cleared in clinic.   Discharge follow-up:   Please follow-up with Dr. Whitney in clinic in one month     Please follow-up with your PCP within 7 days to ensure your systolic blood pressure remains less than 140     Wound care: Ok to shower,however no scrubbing of the wound and no soaking of the wound, meaning no bathtubs or swimming pools. Pat dry only. Leave wound open to air.  Patient to have wound checked 2 weeks following surgery.       Please call if you have:  1. increased pain, redness, drainage, swelling at your incision  2. fevers > 101.5 F degrees  3. with any questions or concerns.  You may reach the Neurosurgery clinic at [phone] during regular work hours. ER at [phone].    and ask for the Neurosurgery Resident on call at [phone], during off hours or weekends.         Discharge Disposition:     Discharged to home          ONEL Dwyer, CNP  Department of Neurosurgery  Pager: [phone]

## 2019-12-30 NOTE — PLAN OF CARE
Status: 4a transfer POD #2 R parietal AVM resection.  Vitals: VSS on RA  Neuros: A&Ox4, All extremities 5/5.  IV: L PIV TKO, R PIV SL  Resp/trach: WNL  Diet: Regular diet  Bowel status: No BM since 12/27, bowel meds given  : Voiding spontaneously  Skin: R head dressing CDI. R groin site intact.  Pain: Denies  Activity: SBA  Social: Wife at beside  Plan: Continue to monitor and follow POC

## 2019-12-30 NOTE — PLAN OF CARE
Status: POD #3 s/p R parietal crani for resection of AVM.   Vitals: VSS, RA.  Neuros: Intact.  IV: PIVs SL.  Resp/trach: LS CTA.  Diet: Regular. Encourage oral intake.   Bowel status: BS+, passing flatus. Last BM 12/27.  : Voiding w/o difficulty.   Skin: Crani dressing w/ scant marked drainage.  Pain: Denies.   Activity: Up w/ SBA. Calls appropriately.   Social: Wife roomed in.   Plan: Continue to monitor, follow POC.

## 2019-12-31 ENCOUNTER — TELEPHONE (OUTPATIENT)
Dept: FAMILY MEDICINE | Facility: CLINIC | Age: 49
End: 2019-12-31

## 2019-12-31 LAB — COPATH REPORT: NORMAL

## 2019-12-31 NOTE — TELEPHONE ENCOUNTER
Patient discharged from [facility] for inpatient hospital stay on 12/30 for arteriovenous malformation, cerebral.    Please contact patient to follow up; no appointment scheduled at this time.    ER / IP:  0/1    Care Coordination:  shakila Leung

## 2019-12-31 NOTE — TELEPHONE ENCOUNTER
"Hospital/TCU/ED for chronic condition Discharge Protocol    \"Hi, my name is Nuris Leija RN, a registered nurse, and I am calling from [facility].  I am calling to follow up and see how things are going for you after your recent emergency visit/hospital/TCU stay.\"    Tell me how you are doing now that you are home?\" Doing well.  Incision looks good.      Discharge Instructions    \"Let's review your discharge instructions.  What is/are the follow-up recommendations?  Pt. Response: follow up with PCP within 7 days for BP check and follow up with surgeon in 1 month    \"Has an appointment with your primary care provider been scheduled?\"   Yes. (confirm)    \"When you see the provider, I would recommend that you bring your medications with you.\"    Medications    \"Tell me what changed about your medicines when you discharged?\"    Changes to chronic meds?    0-1    \"What questions do you have about your medications?\"    None     New diagnoses of heart failure, COPD, diabetes, or MI?    No              Post Discharge Medication Reconciliation Status: discharge medications reconciled, continue medications without change.    Was MTM referral placed (*Make sure to put transitions as reason for referral)?   No    Call Summary    \"What questions or concerns do you have about your recent visit and your follow-up care?\"     none    \"If you have questions or things don't continue to improve, we encourage you contact us through the main clinic number (give number).  Even if the clinic is not open, triage nurses are available 24/7 to help you.     We would like you to know that our clinic has extended hours (provide information).  We also have urgent care (provide details on closest location and hours/contact info)\"      \"Thank you for your time and take care!\"         TATO Gambino, RN  [employer] Triage      "

## 2020-01-02 ENCOUNTER — PATIENT OUTREACH (OUTPATIENT)
Dept: NEUROSURGERY | Facility: CLINIC | Age: 50
End: 2020-01-02

## 2020-01-02 NOTE — PROGRESS NOTES
Post Op Call      Date of Surgery - 12/27    Date of Discharge - 12/30    Procedure - 12/27/2019  1.  Right parietal craniotomy.   2.  Resection of arteriovenous malformation, complex.   3.  Intraoperative use of microscope.   4.  Intraoperative use of frameless stereotactic neuronavigation.     Original symptoms -  Light headness    Current symptoms - Pt has occasional mild pain at the surgical site.    Pain Level and Med Use - mild and is controlled to the pt's satisfaction with Acetaminophen     Mobility - Gait steady    Appetite - Pt stated that he has a good appetite    Bowel Movements - Regular    Surgical site - Wife looks at surgical site and it does not look infected.    Writer let pt know that he has a follow up appt on 1/10 to have his sutures removed.  Pt expressed understanding of the information given.

## 2020-01-06 ENCOUNTER — OFFICE VISIT (OUTPATIENT)
Dept: FAMILY MEDICINE | Facility: CLINIC | Age: 50
End: 2020-01-06
Payer: COMMERCIAL

## 2020-01-06 VITALS
SYSTOLIC BLOOD PRESSURE: 100 MMHG | HEART RATE: 91 BPM | BODY MASS INDEX: 21.52 KG/M2 | WEIGHT: 114 LBS | DIASTOLIC BLOOD PRESSURE: 62 MMHG | TEMPERATURE: 98.3 F | OXYGEN SATURATION: 99 % | HEIGHT: 61 IN

## 2020-01-06 DIAGNOSIS — D64.9 ANEMIA, UNSPECIFIED TYPE: ICD-10-CM

## 2020-01-06 DIAGNOSIS — Q28.2 ARTERIOVENOUS MALFORMATION, CEREBRAL: Primary | ICD-10-CM

## 2020-01-06 LAB
ERYTHROCYTE [DISTWIDTH] IN BLOOD BY AUTOMATED COUNT: 12 % (ref 10–15)
HCT VFR BLD AUTO: 38.8 % (ref 40–53)
HGB BLD-MCNC: 12.9 G/DL (ref 13.3–17.7)
MCH RBC QN AUTO: 30.4 PG (ref 26.5–33)
MCHC RBC AUTO-ENTMCNC: 33.2 G/DL (ref 31.5–36.5)
MCV RBC AUTO: 92 FL (ref 78–100)
PLATELET # BLD AUTO: 331 10E9/L (ref 150–450)
RBC # BLD AUTO: 4.24 10E12/L (ref 4.4–5.9)
WBC # BLD AUTO: 5.5 10E9/L (ref 4–11)

## 2020-01-06 PROCEDURE — 36415 COLL VENOUS BLD VENIPUNCTURE: CPT | Performed by: FAMILY MEDICINE

## 2020-01-06 PROCEDURE — 99495 TRANSJ CARE MGMT MOD F2F 14D: CPT | Performed by: FAMILY MEDICINE

## 2020-01-06 PROCEDURE — 85027 COMPLETE CBC AUTOMATED: CPT | Performed by: FAMILY MEDICINE

## 2020-01-06 ASSESSMENT — MIFFLIN-ST. JEOR: SCORE: 1235.48

## 2020-01-06 NOTE — PROGRESS NOTES
"Barrett Recinos is a 49 year old male who presents to clinic today for the following health issues:    [facility]       Hospital Follow-up Visit:    Hospital/Nursing Home/IP Rehab Facility: [facility]  Date of Admission: 12/27/2019  Date of Discharge: 12/30/2019  Reason(s) for Admission: Arteriovenous malformation, cerebral            Problems taking medications regularly:  None       Medication changes since discharge: None       Problems adhering to non-medication therapy:  None    Summary of hospitalization:  [facility] discharge summary reviewed  Diagnostic Tests/Treatments reviewed.  Follow up needed: Yes - scheduled  Other Healthcare Providers Involved in Patient s Care:         Surgical follow-up appointment - 1/10/2020  Update since discharge: improved. Some soreness - takes Tylenol. No trouble in arms, legs, or speaking.     Post Discharge Medication Reconciliation: discharge medications reconciled, continue medications without change.  Plan of care communicated with patient and family       Reviewed and updated as needed this visit by provider:  Tobacco  Allergies  Meds  Problems  Med Hx  Surg Hx  Fam Hx         Review of Systems   Constitutional, HEENT, cardiovascular, pulmonary, GI, , musculoskeletal, neuro, skin, endocrine and psych systems are negative, except as otherwise noted.    This document serves as a record of the services and decisions personally performed and made by Lauri Beltrán MD. It was created on his behalf by Hany Garcia, a trained medical scribe. The creation of this document is based the provider's statements to the medical scribe.  Vinny Garcia 3:19 PM, January 6, 2020    Objective   /62   Pulse 91   Temp 98.3  F (36.8  C) (Oral)   Ht 1.549 m (5' 1\")   Wt 51.7 kg (114 lb)   SpO2 99%   BMI 21.54 kg/m   Body mass index is 21.54 kg/m .  Physical Exam   GENERAL: healthy, alert, well nourished, well hydrated, no " distress  EYES: Eyes grossly normal to inspection, extraocular movements - intact, and PERRL  HENT: ear canals- normal; TMs- normal; Nose- normal; Mouth- no ulcers, no lesions  NECK: no tenderness, no adenopathy, no asymmetry, no masses, no stiffness; thyroid- normal to palpation  RESP: lungs clear to auscultation - no rales, no rhonchi, no wheezes  CV: regular rates and rhythm, normal S1 S2, no S3 or S4 and no murmur, no click or rub -  ABDOMEN: soft, no tenderness, no  hepatosplenomegaly, no masses, normal bowel sounds  MS: extremities- no gross deformities noted, no edema  SKIN: well healed incision on the right temple scalp, otherwise, no suspicious lesions, no rashes  NEURO: strength and tone- normal, sensory exam- grossly normal, mentation- intact, speech- normal, reflexes- symmetric  PSYCH: Alert and oriented times 3; speech- coherent , normal rate and volume; able to articulate logical thoughts, able to abstract reason, no tangential thoughts, no hallucinations or delusions, affect- normal  LYMPHATICS: ant. cervical- normal, post. cervical- normal, axillary- normal, supraclavicular- normal, inguinal- normal  Hemoglobin   Date Value Ref Range Status   01/06/2020 12.9 (L) 13.3 - 17.7 g/dL Final         Assessment & Plan   Lizz was seen today for hospital f/u.    Diagnoses and all orders for this visit:    Anemia, unspecified type - last Hemoglobin low. Labs pending today - improving.   -     CBC with platelets    Arteriovenous malformation, cerebral - incision well healed and patient improving. Follow up in 4 days at scheduled appointment.     See Patient Instructions    Return in about 6 months (around 7/6/2020) for Recheck.    The information in this document, created by the medical scribe for me, accurately reflects the services I personally performed and the decisions made by me. I have reviewed and approved this document for accuracy prior to leaving the patient care area.  3:28 PM, 01/06/20        Armond  Leigh Ann HARO      [address] [address]  [email or web address]   Office: [phone]  Pager: [phone]

## 2020-01-06 NOTE — RESULT ENCOUNTER NOTE
Dear Lizz,    Here is a summary of your recent test results:  -Hemoglobin is decreased indicating anemia but it is improving.  -White blood cell and platelet counts are normal.    For additional lab test information, [email or web address].org is an excellent reference.             Thank you very much for trusting me and [facility].     Healthy regards,  Armond Beltrán MD

## 2020-01-10 ENCOUNTER — OFFICE VISIT (OUTPATIENT)
Dept: NEUROSURGERY | Facility: CLINIC | Age: 50
End: 2020-01-10
Payer: COMMERCIAL

## 2020-01-10 VITALS
SYSTOLIC BLOOD PRESSURE: 106 MMHG | BODY MASS INDEX: 22.2 KG/M2 | WEIGHT: 113.1 LBS | OXYGEN SATURATION: 96 % | DIASTOLIC BLOOD PRESSURE: 70 MMHG | HEART RATE: 90 BPM | HEIGHT: 60 IN

## 2020-01-10 DIAGNOSIS — Q28.2 ARTERIOVENOUS MALFORMATION, CEREBRAL: Primary | ICD-10-CM

## 2020-01-10 DIAGNOSIS — Z98.890 S/P CRANIOTOMY: ICD-10-CM

## 2020-01-10 ASSESSMENT — MIFFLIN-ST. JEOR: SCORE: 1221.55

## 2020-01-10 ASSESSMENT — PAIN SCALES - GENERAL: PAINLEVEL: NO PAIN (0)

## 2020-01-10 NOTE — PROGRESS NOTES
"[facility]    Neurosurgery Clinic Progress Note      Reason for Visit:        2 Week Post-Operative Evaluation and Wound Assessment    Arteriovenous malformation, cerebral [Q28.2]          Procedures:   12/27/2019  1.  Right parietal craniotomy.       History of Presenting Illness:   Mr. 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  Cerebral angiography confirmed that this unruptured AVM was in the right angular gyrus and was supplied mainly by right middle cerebral artery branches with multiple superficial draining veins.  Based on his age and risk for rupture, we determined that resection of the AVM was the best treatment.  He underwent rt partietal craniotomy with Dr. Whitney on 12/27/2019.    On postoperative day #3 the patient was doing well and was discharged home with family.  Prior to discharge the patient denied pain, tolerating diet, voiding, passing bowel movements and mobilizing independently.  The patient will follow-up in 2 weeks for wound check.  The patient should also follow-up with his primary care provider for blood pressure check and potential medication alterations in order to keep systolic blood pressure less than 140.No postoperative imaging was obtained for an intraoperative angiogram was performed revealing no residual.      Today, he is doing well.  He is not c/o H/A, dizziness and no episodes of syncope.  He is scheduled to return to work on February 3, 2020.  He feels he will be able to return to work on this   Date.  He is not c/o pain at the incision site.  He takes occas Tylenol if needed.  No other new concerns.     Examination:   /70 (BP Location: Right arm, Patient Position: Sitting, Cuff Size: Adult Regular)   Pulse 90   Ht 1.534 m (5' 0.38\")   Wt 51.3 kg (113 lb 1.6 oz)   SpO2 96%   BMI 21.81 kg/m      Neurological " Assessment:     General Appearance: Awake; Well-Nourished; Pleasant; In no apparent distress  Mental Status: Alert and Oriented to Person, Place, Time and Situation  Speech: Fluent; No aphasia or dysarthria    Cranial Nerves:  Pupils Equal and Reactive to Light and Accommodation  Extra-Ocular Movements Intact  Visual Fields Intact  Facial Movements Symmetric  Facial Sensation Intact to Light Touch in the V1-V3 distributions bilaterally  Tongue Protrusion Midline  Shoulder Shrug 5/5    Motor:  Upper Extremities:     C5 (Deltoid) C5/6 (Bicep) C7 (Tricep) C8 (Finger Flexion) T1 (Interosseous)   Right 5/5 5/5 5/5 5/5 5/5   Left 5/5 5/5 5/5 5/5 5/5     Lower Extremities:   L2 (Hip Flexion) L3 (Knee Extension)  L4 (Foot Dorsiflexion) L5 (EHL Extesnion) S1 (Foot Plantar Extension)   Right 5/5 5/5 5/5 5/5 5/5   Left 5/5 5/5 5/5 5/5 5/5     Sensory: Intact to Light Touch in Bilateral Upper and Lower Extremities     Incision:   Rt cranial incision is clean, dry, intact.  There is no erythema, swelling, or drainage.     Assessment:           S/p rt parietal craniotomy for AVM     Plan:   ~Sutures removed  ~Return to Neurosurgery Clinic for Routine Post-Operative Evaluation with Dr. Whitney on 1/27/2020  ~Letter written to return to work on 2/03/2020   ~Continue restrictions until seen by Dr Chelo Meyers DNP  Neurosurgery Nurse Practitioner  [facility]  [phone]

## 2020-01-10 NOTE — NURSING NOTE
Chief Complaint   Patient presents with     RECHECK     [facility] RETURN - 2 WEEK POST OP DOS 12/27/2019       Preventive Care:    Colorectal Cancer Screening: During our visit today, we discussed that it is recommended you receive colorectal cancer screening. Please call or make an appointment with your primary care provider to discuss this. You may also call the [facility] scheduling line ([phone]) to set up a colonoscopy appointment.      Ajith Paniagua, EMT

## 2020-01-10 NOTE — LETTER
January 10, 2020      RE: Lizz Recinos  [address] [facility] [address]       To whom it may concern:    Lizz Recinos was seen in our Neurosurgery clinic today. He  may return to work Light duty-unable to lift more than 10 pounds, on February 3rd, 2020    He will follow up with his neurosurgeon on Tuesday January 28, 2020.    Sincerely,      Magalie Meyers DNP

## 2020-01-10 NOTE — LETTER
1/10/2020       RE: Lizz Recinos  [address] [address]     Dear Colleague,    Thank you for referring your patient, Lizz Recinos, to the [facility] NEUROSURGERY at [facility]. Please see a copy of my visit note below.    [facility]    Neurosurgery Clinic Progress Note    Reason for Visit:        2 Week Post-Operative Evaluation and Wound Assessment    Arteriovenous malformation, cerebral [Q28.2]          Procedures:   12/27/2019  1.  Right parietal craniotomy.     History of Presenting Illness:   Mr. 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  Cerebral angiography confirmed that this unruptured AVM was in the right angular gyrus and was supplied mainly by right middle cerebral artery branches with multiple superficial draining veins.  Based on his age and risk for rupture, we determined that resection of the AVM was the best treatment.  He underwent rt partietal craniotomy with Dr. Whitney on 12/27/2019.    On postoperative day #3 the patient was doing well and was discharged home with family.  Prior to discharge the patient denied pain, tolerating diet, voiding, passing bowel movements and mobilizing independently.  The patient will follow-up in 2 weeks for wound check.  The patient should also follow-up with his primary care provider for blood pressure check and potential medication alterations in order to keep systolic blood pressure less than 140.No postoperative imaging was obtained for an intraoperative angiogram was performed revealing no residual.      Today, he is doing well.  He is not c/o H/A, dizziness and no episodes of syncope.  He is scheduled to return to work on February 3, 2020.  He feels he will be able to return to work on this   Date.  He is not c/o pain at the incision site.  He  "takes occas Tylenol if needed.  No other new concerns.     Examination:   /70 (BP Location: Right arm, Patient Position: Sitting, Cuff Size: Adult Regular)   Pulse 90   Ht 1.534 m (5' 0.38\")   Wt 51.3 kg (113 lb 1.6 oz)   SpO2 96%   BMI 21.81 kg/m       Neurological Assessment:     General Appearance: Awake; Well-Nourished; Pleasant; In no apparent distress  Mental Status: Alert and Oriented to Person, Place, Time and Situation  Speech: Fluent; No aphasia or dysarthria    Cranial Nerves:  Pupils Equal and Reactive to Light and Accommodation  Extra-Ocular Movements Intact  Visual Fields Intact  Facial Movements Symmetric  Facial Sensation Intact to Light Touch in the V1-V3 distributions bilaterally  Tongue Protrusion Midline  Shoulder Shrug 5/5    Motor:  Upper Extremities:     C5 (Deltoid) C5/6 (Bicep) C7 (Tricep) C8 (Finger Flexion) T1 (Interosseous)   Right 5/5 5/5 5/5 5/5 5/5   Left 5/5 5/5 5/5 5/5 5/5     Lower Extremities:   L2 (Hip Flexion) L3 (Knee Extension)  L4 (Foot Dorsiflexion) L5 (EHL Extesnion) S1 (Foot Plantar Extension)   Right 5/5 5/5 5/5 5/5 5/5   Left 5/5 5/5 5/5 5/5 5/5     Sensory: Intact to Light Touch in Bilateral Upper and Lower Extremities     Incision:   Rt cranial incision is clean, dry, intact.  There is no erythema, swelling, or drainage.     Assessment:           S/p rt parietal craniotomy for AVM     Plan:   ~Sutures removed  ~Return to Neurosurgery Clinic for Routine Post-Operative Evaluation with Dr. Whitney on 1/27/2020  ~Letter written to return to work on 2/03/2020   ~Continue restrictions until seen by Dr Chelo Meyers, PARI  Neurosurgery Nurse Practitioner  [facility]  [phone]  "

## 2020-01-17 ENCOUNTER — TELEPHONE (OUTPATIENT)
Dept: NEUROSURGERY | Facility: CLINIC | Age: 50
End: 2020-01-17

## 2020-01-17 NOTE — TELEPHONE ENCOUNTER
[facility]    Phone Message    May a detailed message be left on voicemail: yes    Reason for Call: Other: Pt is requesting a call back for appt. Pt stated he was scheduled for the 28th of Jan but wasnt given a time and I'm not seeing the appt set in the chart.      Action Taken: Message routed to:  [facility] ([facility] Neuro

## 2020-01-20 NOTE — TELEPHONE ENCOUNTER
Returned call.    Writer followed up with pt.  The next appt should be the 6 week follow up and meet with NP.  Six week would be the first week in February.  Writer send a note to the schedule and let the pt know that he would be contacted regarding the 6 week follow up.  Pt expressed understanding of the information given.

## 2020-02-05 ENCOUNTER — OFFICE VISIT (OUTPATIENT)
Dept: NEUROSURGERY | Facility: CLINIC | Age: 50
End: 2020-02-05

## 2020-02-05 VITALS
WEIGHT: 116.1 LBS | HEIGHT: 60 IN | HEART RATE: 87 BPM | DIASTOLIC BLOOD PRESSURE: 78 MMHG | SYSTOLIC BLOOD PRESSURE: 118 MMHG | BODY MASS INDEX: 22.79 KG/M2

## 2020-02-05 DIAGNOSIS — Z98.890 S/P CRANIOTOMY: ICD-10-CM

## 2020-02-05 DIAGNOSIS — Q28.2 ARTERIOVENOUS MALFORMATION, CEREBRAL: Primary | ICD-10-CM

## 2020-02-05 ASSESSMENT — PAIN SCALES - GENERAL: PAINLEVEL: NO PAIN (0)

## 2020-02-05 ASSESSMENT — MIFFLIN-ST. JEOR: SCORE: 1235.16

## 2020-02-05 NOTE — PROGRESS NOTES
"[facility]   Neurosurgery Clinic Progress Note      Reason for Visit:               Post-Operative Evaluation and Wound Assessment      Arteriovenous malformation, cerebral [Q28.2]          Procedures:   12/27/2019  1.  Right parietal craniotomy.         History of Presenting Illness:   Mr. 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  Cerebral angiography confirmed that this unruptured AVM was in the right angular gyrus and was supplied mainly by right middle cerebral artery branches with multiple superficial draining veins.  Based on his age and risk for rupture, we determined that resection of the AVM was the best treatment.  He underwent rt partietal craniotomy with Dr. Whitney on 12/27/2019.    Today, he is doing very well.  He is not c/o H/A.  He has some numbness at the base of the incision just above his ear on the left.  He also has a slight indentation from his eyeglasses. But otherwise the skin is taut, no swelling.  He is ambulating.  No other concerns or complaints.     Examination:   /78 (BP Location: Left arm, Patient Position: Chair, Cuff Size: Adult Regular)   Pulse 87   Ht 1.534 m (5' 0.38\")   Wt 52.7 kg (116 lb 1.6 oz)   BMI 22.39 kg/m      Neurological Assessment:     General Appearance: Awake; Well-Nourished; Pleasant; In no apparent distress  Mental Status: Alert and Oriented to Person, Place, Time and Situation  Speech: Fluent; No aphasia or dysarthria    Cranial Nerves:  Pupils Equal and Reactive to Light and Accommodation  Extra-Ocular Movements Intact  Visual Fields Intact  Facial Movements Symmetric  Facial Sensation Intact to Light Touch in the V1-V3 distributions bilaterally  Tongue Protrusion Midline  Shoulder Shrug 5/5    Motor:  Upper Extremities:     C5 (Deltoid) C5/6 (Bicep) C7 (Tricep)   Right 5/5 5/5 5/5   Left 5/5 5/5 " 5/5     Lower Extremities:   L2 (Hip Flexion) L3 (Knee Extension)   Right 5/5 5/5   Left 5/5 5/5       Incision:    Clean, dry, intact. No redness, swelling, ballotable fluid or drainage.   Ambulates without assistive device.     Assessment:     S/p right parietal craniotomy for unruptured grade I right parietal convexity arteriovenous malformation      Plan:   - Return to Neurosurgery Clinic for cerebral angiogram at the one year [facility].  ~F/U with Dr. Whitney following cerebral angiogram       Magalie Meyers DNP  Neurosurgery Nurse Practitioner  [facility]  [phone]

## 2020-02-05 NOTE — LETTER
"2/5/2020       RE: Lizz Recinos  [address] [facility] [address]     Dear Colleague,    Thank you for referring your patient, Lizz Recinos, to the [facility] NEUROSURGERY at [facility]. Please see a copy of my visit note below.    [facility]   Neurosurgery Clinic Progress Note      Reason for Visit:               Post-Operative Evaluation and Wound Assessment      Arteriovenous malformation, cerebral [Q28.2]          Procedures:   12/27/2019  1.  Right parietal craniotomy.         History of Presenting Illness:   Mr. 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  Cerebral angiography confirmed that this unruptured AVM was in the right angular gyrus and was supplied mainly by right middle cerebral artery branches with multiple superficial draining veins.  Based on his age and risk for rupture, we determined that resection of the AVM was the best treatment.  He underwent rt partietal craniotomy with Dr. Whitney on 12/27/2019.    Today, he is doing very well.  He is not c/o H/A.  He has some numbness at the base of the incision just above his ear on the left.  He also has a slight indentation from his eyeglasses. But otherwise the skin is taut, no swelling.  He is ambulating.  No other concerns or complaints.     Examination:   /78 (BP Location: Left arm, Patient Position: Chair, Cuff Size: Adult Regular)   Pulse 87   Ht 1.534 m (5' 0.38\")   Wt 52.7 kg (116 lb 1.6 oz)   BMI 22.39 kg/m       Neurological Assessment:     General Appearance: Awake; Well-Nourished; Pleasant; In no apparent distress  Mental Status: Alert and Oriented to Person, Place, Time and Situation  Speech: Fluent; No aphasia or dysarthria    Cranial Nerves:  Pupils Equal and Reactive to Light and Accommodation  Extra-Ocular Movements " Intact  Visual Fields Intact  Facial Movements Symmetric  Facial Sensation Intact to Light Touch in the V1-V3 distributions bilaterally  Tongue Protrusion Midline  Shoulder Shrug 5/5    Motor:  Upper Extremities:     C5 (Deltoid) C5/6 (Bicep) C7 (Tricep)   Right 5/5 5/5 5/5   Left 5/5 5/5 5/5     Lower Extremities:   L2 (Hip Flexion) L3 (Knee Extension)   Right 5/5 5/5   Left 5/5 5/5       Incision:    Clean, dry, intact. No redness, swelling, ballotable fluid or drainage.   Ambulates without assistive device.     Assessment:     S/p right parietal craniotomy for unruptured grade I right parietal convexity arteriovenous malformation      Plan:   - Return to Neurosurgery Clinic for cerebral angiogram at the one year [facility].  ~F/U with Dr. Whitney following cerebral angiogram       Magalie Meyers DNP  Neurosurgery Nurse Practitioner  [facility] [place]  [phone]

## 2020-02-05 NOTE — LETTER
February 5, 2020      RE: Lizz Recinos  [address] [address]         To whom it may concern:    Lizz Recinos was seen in our Neurosurgery clinic today. He  may return to work on February 6th, 2020  Without restrictions.  He is able to drive and operate a motor vehicle.       He will follow up with his neurosurgeon Dr. Whitney for repeat imaging.     Sincerely,          Magalie Meyers DNP  Neurosurgery Nurse Practitioner  [facility]  [phone]

## 2020-02-07 ENCOUNTER — TELEPHONE (OUTPATIENT)
Dept: NEUROLOGY | Facility: CLINIC | Age: 50
End: 2020-02-07

## 2020-02-07 NOTE — TELEPHONE ENCOUNTER
Patient 6 weeks s/p resection of AVM. Spoke with patient who reports he is doing well. Returned to work yesterday. So far it is going well. Patient has no concerns at this time. Informed patient that we will do follow-up angio in December, and we will contact him closer to that time to arrange (message sent). Patient verbalized understanding. Patient has my contact information and was encouraged to call with questions/concerns.

## 2020-11-03 DIAGNOSIS — Q28.2 AVM (ARTERIOVENOUS MALFORMATION) BRAIN: Primary | ICD-10-CM

## 2020-11-22 ENCOUNTER — HEALTH MAINTENANCE LETTER (OUTPATIENT)
Age: 50
End: 2020-11-22

## 2021-01-08 DIAGNOSIS — Z11.59 ENCOUNTER FOR SCREENING FOR OTHER VIRAL DISEASES: Primary | ICD-10-CM

## 2021-01-11 ENCOUNTER — TELEPHONE (OUTPATIENT)
Dept: NEUROSURGERY | Facility: CLINIC | Age: 51
End: 2021-01-11

## 2021-01-11 NOTE — TELEPHONE ENCOUNTER
Called patient to discuss angiogram with Dr Whitney scheduled for 1/25/2021.    Patient requested a call back around 3:15-3:30 to discuss.

## 2021-01-13 ENCOUNTER — PATIENT OUTREACH (OUTPATIENT)
Dept: NEUROLOGY | Facility: CLINIC | Age: 51
End: 2021-01-13

## 2021-01-13 NOTE — PATIENT INSTRUCTIONS
You are scheduled for a Diagnostic Cerebral Angiogram with Dr. Whitney on 1/25/21 at 9:30 AM.     Please follow these instructions:    * Prior to your procedure you will need to obtain COVID-19 testing within 2-4 days of your scheduled procedure. You are scheduled for 1/22/21 at 1:45 PM at [facility], located at [address] [place] [id number]. Their phone number is [phone]. You will also need to obtain blood work (which will be ordered by Dr. Estefany Rosales) at time of COVID testing. Please contact [facility] to schedule an appointment for your blood work.    * You should arrive at the [facility] waiting room at the [facility] at 8:00 AM. The address is [address] [place] [address]. The phone number is [phone].     * Do not eat after Midnight; you may drink clear liquids (includes water, Jell-O, clear broth, apple juice or any non-carbonated beverage that you can see through) until 7:30 AM.     * You may take your medications with a sip of water the morning of the procedure.     * You will be discharged the same day. You must have a  home and someone that can stay with you through the night.     PLEASE NOTE our COVID-19 visitors policy: For the protection of our patients, visitors are not allowed in the hospital effective 11/17/20, due to the rising numbers of COVID-19. Please contact the hospital at [phone] for the most recent updates to the COVID-19 visitors policy.     All discharge instructions will be given to the  or volunteer. Documentation for the post-operative plan will be given to the patient and . Patients are required to have someone to stay with them for 24 hours after their procedure.    If you have questions regarding your procedure, please contact me at [phone], option 3.    If you need to cancel, reschedule or have procedure scheduling related questions, please call  Melissa at [phone].    Thank you,  Jeremy Montanez RN, CNRN  Stroke & Endovascular Care Coordinator

## 2021-01-14 ENCOUNTER — TELEPHONE (OUTPATIENT)
Dept: NEUROLOGY | Facility: CLINIC | Age: 51
End: 2021-01-14

## 2021-01-14 NOTE — TELEPHONE ENCOUNTER
Spoke to patient regarding message below. Patient aware and understands. No other question at this time.      ----- Message -----  From: Sarah Pena CMA  Sent: 1/13/2021   3:20 PM CST  To: Lesa Montanez RN  Subject: RE: PA for Angio                                 Hi Jeremy, I called insurance spoke to Bri she said if its out pt (no PA needed) if its In patient procedure ( we will need PA)  ----- Message -----  From: Lesa Montanez RN  Sent: 1/13/2021   9:56 AM CST  To: Sarah Pena CMA  Subject: PA for Angio                                     Hi Renato,    Patient is scheduled for a diagnostic cerebral angiogram (CPT code 88542) on 1/25/21. He believes he needs a PA for this. Can you please contact his insurance and find out, and get PA form, if needed? Diagnosis code: Q28.2 - AVM (arteriovenous malformation) brain. This is to follow-up on his AVM that was surgically resected on 12/27/19.     Dr. Whitney's NPI number: [id number]    Patient has new insurance numbers:  [facility]   ID# [id number]  [facility]# [id number]  Ph: [phone]    Please call me with any questions.    ThanksJeremy

## 2021-01-15 ENCOUNTER — TELEPHONE (OUTPATIENT)
Dept: LAB | Facility: CLINIC | Age: 51
End: 2021-01-15

## 2021-01-15 DIAGNOSIS — Q28.2 ARTERIOVENOUS MALFORMATION, CEREBRAL: Primary | ICD-10-CM

## 2021-01-21 NOTE — PROGRESS NOTES
Informed patient of procedure instructions. Confirmed date and time. Patient verbalized understanding. All questions answered. Patient instructions sent via [employer]. Patient will call to schedule blood work at [facility].     Patient believes he needs a PA for angio.    Patient has new insurance numbers:  [employer] [facility]   ID# [id number]  [facility]# [id number]  Ph: [phone]    Will have registration contact patient to update insurance. Will have Navigator Team contact insurance to obtain PA if needed. Patient verbalized understanding. Lesa Montanez RN 1/13/2021 9:56 AM        No

## 2021-01-22 ENCOUNTER — OFFICE VISIT (OUTPATIENT)
Dept: LAB | Facility: CLINIC | Age: 51
End: 2021-01-22
Payer: COMMERCIAL

## 2021-01-22 DIAGNOSIS — Q28.2 ARTERIOVENOUS MALFORMATION, CEREBRAL: ICD-10-CM

## 2021-01-22 DIAGNOSIS — Z11.59 ENCOUNTER FOR SCREENING FOR OTHER VIRAL DISEASES: ICD-10-CM

## 2021-01-22 LAB
APTT PPP: 32 SEC (ref 22–37)
ERYTHROCYTE [DISTWIDTH] IN BLOOD BY AUTOMATED COUNT: 11.8 % (ref 10–15)
HCT VFR BLD AUTO: 39.9 % (ref 40–53)
HGB BLD-MCNC: 13.3 G/DL (ref 13.3–17.7)
INR PPP: 0.98 (ref 0.86–1.14)
MCH RBC QN AUTO: 30.4 PG (ref 26.5–33)
MCHC RBC AUTO-ENTMCNC: 33.3 G/DL (ref 31.5–36.5)
MCV RBC AUTO: 91 FL (ref 78–100)
PLATELET # BLD AUTO: 213 10E9/L (ref 150–450)
RBC # BLD AUTO: 4.37 10E12/L (ref 4.4–5.9)
SARS-COV-2 RNA RESP QL NAA+PROBE: NORMAL
SPECIMEN SOURCE: NORMAL
WBC # BLD AUTO: 8.7 10E9/L (ref 4–11)

## 2021-01-22 PROCEDURE — 36415 COLL VENOUS BLD VENIPUNCTURE: CPT | Performed by: PSYCHIATRY & NEUROLOGY

## 2021-01-22 PROCEDURE — 85610 PROTHROMBIN TIME: CPT | Performed by: PSYCHIATRY & NEUROLOGY

## 2021-01-22 PROCEDURE — 85730 THROMBOPLASTIN TIME PARTIAL: CPT | Performed by: PSYCHIATRY & NEUROLOGY

## 2021-01-22 PROCEDURE — 85027 COMPLETE CBC AUTOMATED: CPT | Performed by: PSYCHIATRY & NEUROLOGY

## 2021-01-22 PROCEDURE — U0005 INFEC AGEN DETEC AMPLI PROBE: HCPCS | Performed by: NEUROLOGICAL SURGERY

## 2021-01-22 PROCEDURE — 80048 BASIC METABOLIC PNL TOTAL CA: CPT | Performed by: PSYCHIATRY & NEUROLOGY

## 2021-01-22 PROCEDURE — U0003 INFECTIOUS AGENT DETECTION BY NUCLEIC ACID (DNA OR RNA); SEVERE ACUTE RESPIRATORY SYNDROME CORONAVIRUS 2 (SARS-COV-2) (CORONAVIRUS DISEASE [COVID-19]), AMPLIFIED PROBE TECHNIQUE, MAKING USE OF HIGH THROUGHPUT TECHNOLOGIES AS DESCRIBED BY CMS-2020-01-R: HCPCS | Performed by: NEUROLOGICAL SURGERY

## 2021-01-23 LAB
ANION GAP SERPL CALCULATED.3IONS-SCNC: 5 MMOL/L (ref 3–14)
BUN SERPL-MCNC: 15 MG/DL (ref 7–30)
CALCIUM SERPL-MCNC: 8.5 MG/DL (ref 8.5–10.1)
CHLORIDE SERPL-SCNC: 102 MMOL/L (ref 94–109)
CO2 SERPL-SCNC: 27 MMOL/L (ref 20–32)
CREAT SERPL-MCNC: 0.84 MG/DL (ref 0.66–1.25)
GFR SERPL CREATININE-BSD FRML MDRD: >90 ML/MIN/{1.73_M2}
GLUCOSE SERPL-MCNC: 91 MG/DL (ref 70–99)
LABORATORY COMMENT REPORT: NORMAL
POTASSIUM SERPL-SCNC: 3.9 MMOL/L (ref 3.4–5.3)
SARS-COV-2 RNA RESP QL NAA+PROBE: NEGATIVE
SODIUM SERPL-SCNC: 134 MMOL/L (ref 133–144)
SPECIMEN SOURCE: NORMAL

## 2021-01-25 ENCOUNTER — APPOINTMENT (OUTPATIENT)
Dept: INTERVENTIONAL RADIOLOGY/VASCULAR | Facility: CLINIC | Age: 51
End: 2021-01-25
Attending: NEUROLOGICAL SURGERY
Payer: COMMERCIAL

## 2021-01-25 ENCOUNTER — HOSPITAL ENCOUNTER (OUTPATIENT)
Facility: CLINIC | Age: 51
Discharge: HOME | End: 2021-01-25
Attending: NEUROLOGICAL SURGERY | Admitting: NEUROLOGICAL SURGERY
Payer: COMMERCIAL

## 2021-01-25 ENCOUNTER — APPOINTMENT (OUTPATIENT)
Dept: MEDSURG UNIT | Facility: CLINIC | Age: 51
End: 2021-01-25
Attending: NEUROLOGICAL SURGERY
Payer: COMMERCIAL

## 2021-01-25 VITALS
HEART RATE: 91 BPM | DIASTOLIC BLOOD PRESSURE: 77 MMHG | SYSTOLIC BLOOD PRESSURE: 107 MMHG | BODY MASS INDEX: 20.24 KG/M2 | TEMPERATURE: 97.9 F | OXYGEN SATURATION: 98 % | RESPIRATION RATE: 16 BRPM | WEIGHT: 110 LBS | HEIGHT: 62 IN

## 2021-01-25 DIAGNOSIS — Q28.2 AVM (ARTERIOVENOUS MALFORMATION) BRAIN: ICD-10-CM

## 2021-01-25 PROCEDURE — 250N000009 HC RX 250: Performed by: NEUROLOGICAL SURGERY

## 2021-01-25 PROCEDURE — 250N000009 HC RX 250: Performed by: PSYCHIATRY & NEUROLOGY

## 2021-01-25 PROCEDURE — C1769 GUIDE WIRE: HCPCS

## 2021-01-25 PROCEDURE — 258N000003 HC RX IP 258 OP 636: Performed by: PSYCHIATRY & NEUROLOGY

## 2021-01-25 PROCEDURE — 36224 PLACE CATH CAROTD ART: CPT | Mod: 58 | Performed by: NEUROLOGICAL SURGERY

## 2021-01-25 PROCEDURE — 99152 MOD SED SAME PHYS/QHP 5/>YRS: CPT | Mod: GC | Performed by: NEUROLOGICAL SURGERY

## 2021-01-25 PROCEDURE — 250N000011 HC RX IP 250 OP 636: Performed by: NEUROLOGICAL SURGERY

## 2021-01-25 PROCEDURE — 255N000002 HC RX 255 OP 636: Performed by: NEUROLOGICAL SURGERY

## 2021-01-25 PROCEDURE — 272N000280 HC DEVICE COMPRESSION CR5

## 2021-01-25 PROCEDURE — 99152 MOD SED SAME PHYS/QHP 5/>YRS: CPT

## 2021-01-25 PROCEDURE — C1887 CATHETER, GUIDING: HCPCS

## 2021-01-25 PROCEDURE — 36224 PLACE CATH CAROTD ART: CPT

## 2021-01-25 PROCEDURE — 250N000011 HC RX IP 250 OP 636: Performed by: PSYCHIATRY & NEUROLOGY

## 2021-01-25 PROCEDURE — 36226 PLACE CATH VERTEBRAL ART: CPT

## 2021-01-25 PROCEDURE — 36226 PLACE CATH VERTEBRAL ART: CPT | Mod: 58 | Performed by: NEUROLOGICAL SURGERY

## 2021-01-25 PROCEDURE — 272N000570 HC SHEATH CR7

## 2021-01-25 PROCEDURE — 99153 MOD SED SAME PHYS/QHP EA: CPT

## 2021-01-25 PROCEDURE — 999N000134 HC STATISTIC PP CARE STAGE 3

## 2021-01-25 RX ORDER — FENTANYL CITRATE 50 UG/ML
25-50 INJECTION, SOLUTION INTRAMUSCULAR; INTRAVENOUS EVERY 5 MIN PRN
Status: DISCONTINUED | OUTPATIENT
Start: 2021-01-25 | End: 2021-01-25 | Stop reason: HOSPADM

## 2021-01-25 RX ORDER — LIDOCAINE 40 MG/G
CREAM TOPICAL
Status: DISCONTINUED | OUTPATIENT
Start: 2021-01-25 | End: 2021-01-25 | Stop reason: HOSPADM

## 2021-01-25 RX ORDER — NALOXONE HYDROCHLORIDE 0.4 MG/ML
0.4 INJECTION, SOLUTION INTRAMUSCULAR; INTRAVENOUS; SUBCUTANEOUS
Status: DISCONTINUED | OUTPATIENT
Start: 2021-01-25 | End: 2021-01-25 | Stop reason: HOSPADM

## 2021-01-25 RX ORDER — FLUMAZENIL 0.1 MG/ML
0.2 INJECTION, SOLUTION INTRAVENOUS
Status: DISCONTINUED | OUTPATIENT
Start: 2021-01-25 | End: 2021-01-25 | Stop reason: HOSPADM

## 2021-01-25 RX ORDER — HEPARIN SODIUM 200 [USP'U]/100ML
1 INJECTION, SOLUTION INTRAVENOUS CONTINUOUS PRN
Status: DISCONTINUED | OUTPATIENT
Start: 2021-01-25 | End: 2021-01-25 | Stop reason: HOSPADM

## 2021-01-25 RX ORDER — NALOXONE HYDROCHLORIDE 0.4 MG/ML
0.2 INJECTION, SOLUTION INTRAMUSCULAR; INTRAVENOUS; SUBCUTANEOUS
Status: DISCONTINUED | OUTPATIENT
Start: 2021-01-25 | End: 2021-01-25 | Stop reason: HOSPADM

## 2021-01-25 RX ORDER — NICOTINE POLACRILEX 4 MG
15-30 LOZENGE BUCCAL
Status: DISCONTINUED | OUTPATIENT
Start: 2021-01-25 | End: 2021-01-25 | Stop reason: HOSPADM

## 2021-01-25 RX ORDER — DEXTROSE MONOHYDRATE 25 G/50ML
25-50 INJECTION, SOLUTION INTRAVENOUS
Status: DISCONTINUED | OUTPATIENT
Start: 2021-01-25 | End: 2021-01-25 | Stop reason: HOSPADM

## 2021-01-25 RX ORDER — IODIXANOL 320 MG/ML
100 INJECTION, SOLUTION INTRAVASCULAR ONCE
Status: COMPLETED | OUTPATIENT
Start: 2021-01-25 | End: 2021-01-25

## 2021-01-25 RX ORDER — SODIUM CHLORIDE 9 MG/ML
INJECTION, SOLUTION INTRAVENOUS CONTINUOUS
Status: DISCONTINUED | OUTPATIENT
Start: 2021-01-25 | End: 2021-01-25 | Stop reason: HOSPADM

## 2021-01-25 RX ADMIN — HEPARIN SODIUM 3 BAG: 200 INJECTION, SOLUTION INTRAVENOUS at 12:56

## 2021-01-25 RX ADMIN — MIDAZOLAM 0.5 MG: 1 INJECTION INTRAMUSCULAR; INTRAVENOUS at 13:05

## 2021-01-25 RX ADMIN — IODIXANOL 30 ML: 320 INJECTION, SOLUTION INTRAVASCULAR at 13:12

## 2021-01-25 RX ADMIN — FENTANYL CITRATE 25 MCG: 50 INJECTION, SOLUTION INTRAMUSCULAR; INTRAVENOUS at 12:45

## 2021-01-25 RX ADMIN — SODIUM CHLORIDE: 9 INJECTION, SOLUTION INTRAVENOUS at 08:28

## 2021-01-25 RX ADMIN — HEPARIN SODIUM: 1000 INJECTION, SOLUTION INTRAVENOUS; SUBCUTANEOUS at 12:55

## 2021-01-25 RX ADMIN — LIDOCAINE HYDROCHLORIDE 5 ML: 10 INJECTION, SOLUTION EPIDURAL; INFILTRATION; INTRACAUDAL; PERINEURAL at 12:45

## 2021-01-25 RX ADMIN — FENTANYL CITRATE 25 MCG: 50 INJECTION, SOLUTION INTRAMUSCULAR; INTRAVENOUS at 13:05

## 2021-01-25 RX ADMIN — MIDAZOLAM 0.5 MG: 1 INJECTION INTRAMUSCULAR; INTRAVENOUS at 12:45

## 2021-01-25 ASSESSMENT — VISUAL ACUITY
OU: BASELINE;GLASSES

## 2021-01-25 ASSESSMENT — MIFFLIN-ST. JEOR: SCORE: 1228.21

## 2021-01-25 NOTE — DISCHARGE INSTRUCTIONS
[facility]         Interventional Radiology  Discharge Instructions Post Angiogram (NEURO)    AFTER YOU GO HOME  ? If you were given sedation, for the first 24 hours: we recommend that an adult stay with you, DO NOT drive or operate machinery at home or at work, DO NOT smoke, DO NOT make any important or legal decisions.  ? DO NOT drink alcoholic beverages the day of your procedure  ? DO relax and take it easy for 24 hours  ? DO drink plenty of fluids  ? DO resume your regular diet, unless otherwise instructed by your Primary Physician  ? DO NOT take a shower for at least 12 hours following your procedure. No tub bath, hot tubs, or swimming for 5 days. Do NOT scrub the procedure site vigorously for 5 days.      Care of wrist or arm site  It is normal to have soreness at the puncture site and mild tingling in your hand for up to 3 days.    For 2 days, do not use your hand or arm to support your weight (such as rising from a chair) or bend your wrist (such as lifting a garage door).    For 2 days, do not do any strenuous exercise, do not lift more than 5 pounds or exercise your arm (tennis, golf or bowling).    No lotion or powder to the puncture site for 3 days  If you start bleeding from the site in your arm:    Sit down and press firmly on the site with your fingers for 10 minutes. Call your doctor as soon as you can.    If the bleeding stops, sit still and keep your wrist straight for 2 hours.    Call 911 right away if you have: Heavy bleeding, bleeding that does not stop.        CALL THE PHYSICIAN IF:  ? You start bleeding from the procedure site.  ? You have numbness, coolness or change in color of the arm or leg of the puncture site  ? You experience changes in your vision, hearing, balance, coordination, speech, thinking or memory  ? You experience weakness in one or more extremity  ? You experience pain or redness at the puncture site  ? You develop nausea or vomiting  ? You develop hives  or a rash or unexplained itching  ? You develop a temperature of 101 degrees F or greater    [facility] INTERVENTIONAL RADIOLOGY DEPARTMENT  Procedure Physician:  Dr. Whitney and  Dr. Rosales  Date of procedure: January 25, 2021  Telephone Numbers: [phone] Monday-Friday 8:00 am to 4:30 pm  [phone] After 4:30 pm Monday-Friday, Weekends & Holidays.   Ask for the Neuro-Interventional doctor on call.  Someone is on call 24 hrs/day  [facility] toll free number: [phone] Monday-Friday 8:00 am to 4:30 pm  [facility] Emergency Dept: [phone]

## 2021-01-25 NOTE — PROCEDURES
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 Carmen Whitney  Fellow:  Connie      Prior to the start of the procedure and with procedural staff participation, I verbally confirmed: the patient s identity using two indicators, relevant allergies, that the procedure was appropriate and matched the consent or emergent situation, and that the correct equipment/implants were available. Immediately prior to starting the procedure I conducted the Time Out with the procedural staff and re-confirmed the patient s name, procedure, and site/side. (The Joint Commission universal protocol was followed.)  Yes    PRU value: Not applicable    Anesthesia:  Conscious Sedation  Medications:  Fentanyl, Midazolam  Puncture site:  Right Radial Artery    Fluoroscopy time (minutes):  9.2  Radiation dose (mGy):  262    Contrast amount (mL):  30     Estimated blood loss (mL):  minimal    Closure:  Manual    Disposition:  Home after recovery.        Sedation Post-Procedure Summary    Sedatives: Fentanyl and Midazolam (Versed)    Vital signs and pulse oximetry were monitored and remained stable throughout the procedure, and sedation was maintained until the procedure was complete.  The patient was monitored by staff until sedation discharge criteria were met.    Patient tolerance:  Patient tolerated the procedure well with no immediate complications.    Time of sedation in minutes:  30 minutes from beginning to end of physician one to one monitoring.    Estefany Rosales MD  Neuroendovascular Fellow  Pager: [phone]  01/25/2021 1:20 PM    See  official report in PACS  EMANUEL Whitney MD

## 2021-01-25 NOTE — PROGRESS NOTES
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  He reports he is doing well with no issues.  He presents to day for follow up angiogram.     Medical History:  Past Medical History:   Diagnosis Date     CARDIOVASCULAR SCREENING; LDL GOAL LESS THAN 160        Surgical History:  Past Surgical History:   Procedure Laterality Date     IR CAROTID CEREBRAL ANGIOGRAM BILATERAL  9/12/2019     IR CAROTID CEREBRAL ANGIOGRAM BILATERAL  12/27/2019     NO HISTORY OF SURGERY       OPTICAL TRACKING SYSTEM CRANIOTOMY, REPAIR ARTERIOVENOUS MALFORMATION, COMBINED Right 12/27/2019    Procedure: Stealth assisted right parietal craniotomy and resection of arteriovenous malformation, intraoperative cerebral angiography;  Surgeon: Carmen Whitney MD;  Location:  OR       Family History:  Family History   Problem Relation Age of Onset     No Known Problems Brother      No Known Problems Brother      No Known Problems Brother      No Known Problems Brother      No Known Problems Sister      No Known Problems Sister      No Known Problems Sister      No Known Problems Daughter      No Known Problems Son      No Known Problems Son      Colon Cancer No family hx of      Prostate Cancer No family hx of      Diabetes No family hx of      Coronary Artery Disease No family hx of      Cerebrovascular Disease No family hx of      Hypertension No family hx of      Breast Cancer No family hx of      Anesthesia Reaction No family hx of      Deep Vein Thrombosis No family hx of        Social History:  Social History     Socioeconomic History     Marital status:      Spouse name: Linda     Number of children: 3     Years of education: 14     Highest education level: Not on file   Occupational History     Occupation: Technician - fiber optic      Employer: [employer]   Social Needs     Financial resource strain: Not on file     Food insecurity     Worry: Not on file     Inability: Not on file     Transportation needs     Medical: Not on file     Non-medical: Not on file   Tobacco Use     Smoking status: Never Smoker     Smokeless tobacco: Never Used   Substance and Sexual Activity     Alcohol use: Not Currently     Alcohol/week: 1.7 standard drinks     Types: 2 Standard drinks or equivalent per week     Drug use: No     Sexual activity: Yes     Partners: Female     Birth control/protection: Condom   Lifestyle     Physical activity     Days per week: Not on file     Minutes per session: Not on file     Stress: Not on file   Relationships     Social connections     Talks on phone: Not on file     Gets together: Not on file     Attends Anabaptist service: Not on file     Active member of club or organization: Not on file     Attends meetings of clubs or organizations: Not on file     Relationship status: Not on file     Intimate partner violence     Fear of current or ex partner: Not on file     Emotionally abused: Not on file     Physically abused: Not on file     Forced sexual activity: Not on file   Other Topics Concern     Parent/sibling w/ CABG, MI or angioplasty before 65F 55M? No      Service Not Asked     Blood Transfusions Not Asked     Caffeine Concern Yes     Comment: 2 cups daily     Occupational Exposure Not Asked     Hobby Hazards Not Asked     Sleep Concern Not Asked     Stress Concern Not Asked     Weight Concern Not Asked     Special Diet Not Asked     Back Care Not Asked     Exercise Yes     Comment: active     Bike Helmet Not Asked     Seat Belt Yes     Self-Exams Not Asked   Social History Narrative     Not on file   works in fiber optics    Allergies:  No Known Allergies    Is there a contrast allergy?  No    Medications:  I have reviewed this patient's current medications.    ROS:  The 5 point Review of Systems is negative  other than noted in the HPI or here.     PHYSICAL EXAMINATION  Vital Signs:  B/P: 127/90,  T: 97.9,  P: 93,  R: 16    Cardio:  RRR  Pulmonary:  no respiratory distress  Abdomen:  soft, non-tender    Neurologic  Mental Status:  fully alert, attentive and oriented  Cranial Nerves:  PERRL, EOMI with normal smooth pursuit, facial movements symmetric  Motor:  strength 5/5 throughout upper and lower extremities  Sensory: intact to touch    Pre-procedure National Institutes of Health Stroke Scale:   Not applicable    LABS  (most recent Cr, BUN, GFR, PLT, INR, PTT within the past 7 days):  Recent Labs   Lab 01/22/21  1347   CR 0.84   BUN 15   GFRESTIMATED >90   GFRESTBLACK >90      INR 0.98   PTT 32        Platelet Function P2Y12 (PRU):  Not applicable      ASSESSMENT:  51 yo male with grade 1 right angular AVM    PLAN:     - angio  - home after     PRE-PROCEDURE SEDATION ASSESSMENT     Pre-Procedure Sedation Assessment done at 0900.    Expected Level:  Moderate Sedation    Indication:  Sedation is required to allow for neurointerventional procedure.    Consent obtained from patient after discussing the risks, benefits and alternatives.     PO Intake:  Appropriately NPO for procedure    ASA Class:  Class 2 - MILD SYSTEMIC DISEASE, NO ACUTE PROBLEMS, NO FUNCTIONAL LIMITATIONS.    Mallampati:  Grade 2:  Soft palate, base of uvula, tonsillar pillars, and portion of posterior pharyngeal wall visible    History and physical reviewed and no updates needed. I have reviewed the lab findings, diagnostic data, medications, and the plan for sedation. I have determined this patient to be an appropriate candidate for the planned sedation and procedure and have reassessed the patient IMMEDIATELY PRIOR to sedation and procedure.    Patient was discussed with the Attending, Dr. Whitney, who agrees with the plan.    Dora Lott MD   Pager: [phone]      I have reviewed the history. I have seen and examined the patient myself and  agree with the assessment and plan above.  EMANUEL Whitney MD

## 2021-01-25 NOTE — IP AVS SNAPSHOT
[facility] Interventional Radiology  [address] [address]  Phone: [phone]                                    After Visit Summary   1/25/2021    Lizz Recinos    MRN: [id number]           After Visit Summary Signature Page    I have received my discharge instructions, and my questions have been answered. I have discussed any challenges I see with this plan with the nurse or doctor.    ..........................................................................................................................................  Patient/Patient Representative Signature      ..........................................................................................................................................  Patient Representative Print Name and Relationship to Patient    ..................................................               ................................................  Date                                   Time    ..........................................................................................................................................  Reviewed by Signature/Title    ...................................................              ..............................................  Date                                               Time          22EPIC Rev 08/18

## 2021-01-25 NOTE — PROGRESS NOTES
Pt arrived back to unit 2A at 1320.  Right radial TR band present and inflated.  Radial pulse present.  VSS.  Denies pain.  Neuro's intact per baseline.  Pt resting.

## 2021-01-25 NOTE — IR NOTE
Patient Name: Lizz Recinos  Medical Record Number: [id number]  Today's Date: 1/25/2021      Procedure: Diagnostic Cerebral   Angiogram, Radial access with closure device  Proceduralist: Tiny Rosales    Sedation start time: 2142  Sedation end time: 1315  Sedation medications administered: 1 mg versed, 50 mcg fentanyl  Total sedation time: 33 min     Procedure start time: 1242  Procedure end time: 1315      Report given to: Pamela MACK  : N/A    Other Notes: Pt arrived to IR room 3 from . Consent reviewed, pt confirmed. Pt denies any questions or concerns regarding procedure. Pt positioned supine and monitored per protocol. Site cleansed and dressed per protocol. Pt tolerated procedure without any noted complications. Pt transferred back to .     Right Wrist Accessed with 5 fr sheath and removed at 1312  TR Band Closure Device applies.  Balloon inflated to 12mL to achieve hemostasis per protocol over 2 hours

## 2021-01-25 NOTE — PROGRESS NOTES
Patient arrived on 2A for diagnostic cerebral angio.  IV placed.  Labs all done last week.  Neuro status intact - no deficits.  Groin prep done by patient, pulses marked, +3 bilaterally.  Awaiting consent, otherwise prep complete.

## 2021-01-25 NOTE — IP AVS SNAPSHOT
MRN:[id number]                      After Visit Summary   1/25/2021    Lizz Recinos    MRN: [id number]           Visit Information        Department      1/25/2021  7:22 AM [facility] Interventional Radiology          Review of your medicines      UNREVIEWED medicines. Ask your doctor about these medicines       Dose / Directions   acetaminophen 500 MG tablet  Commonly known as: TYLENOL      Dose: 500 mg  Take 500 mg by mouth every 6 hours as needed for mild pain  Refills: 0     MULTIVITAMINS PO      Dose: 2 tablet  Take 2 tablets by mouth every morning  Refills: 0     Vitamin D (Cholecalciferol) 25 MCG (1000 UT) Tabs      Dose: 2,000 Units  Take 2,000 Units by mouth every morning  Refills: 0              Protect others around you: Learn how to safely use, store and throw away your medicines at www.disposemymeds.org.       Follow-ups after your visit       Care Instructions       Further instructions from your care team       [facility]         Interventional Radiology  Discharge Instructions Post Angiogram (NEURO)    AFTER YOU GO HOME  ? If you were given sedation, for the first 24 hours: we recommend that an adult stay with you, DO NOT drive or operate machinery at home or at work, DO NOT smoke, DO NOT make any important or legal decisions.  ? DO NOT drink alcoholic beverages the day of your procedure  ? DO relax and take it easy for 24 hours  ? DO drink plenty of fluids  ? DO resume your regular diet, unless otherwise instructed by your Primary Physician  ? DO NOT take a shower for at least 12 hours following your procedure. No tub bath, hot tubs, or swimming for 5 days. Do NOT scrub the procedure site vigorously for 5 days.      Care of wrist or arm site  It is normal to have soreness at the puncture site and mild tingling in your hand for up to 3 days.    For 2 days, do not use your hand or arm to support your weight (such as rising from a chair) or bend your  wrist (such as lifting a garage door).    For 2 days, do not do any strenuous exercise, do not lift more than 5 pounds or exercise your arm (tennis, golf or bowling).    No lotion or powder to the puncture site for 3 days  If you start bleeding from the site in your arm:    Sit down and press firmly on the site with your fingers for 10 minutes. Call your doctor as soon as you can.    If the bleeding stops, sit still and keep your wrist straight for 2 hours.    Call 911 right away if you have: Heavy bleeding, bleeding that does not stop.        CALL THE PHYSICIAN IF:  ? You start bleeding from the procedure site.  ? You have numbness, coolness or change in color of the arm or leg of the puncture site  ? You experience changes in your vision, hearing, balance, coordination, speech, thinking or memory  ? You experience weakness in one or more extremity  ? You experience pain or redness at the puncture site  ? You develop nausea or vomiting  ? You develop hives or a rash or unexplained itching  ? You develop a temperature of 101 degrees F or greater    [facility] INTERVENTIONAL RADIOLOGY DEPARTMENT  Procedure Physician:  Dr. Whitney and  Dr. Rosales  Date of procedure: January 25, 2021  Telephone Numbers: [phone] Monday-Friday 8:00 am to 4:30 pm  [phone] After 4:30 pm Monday-Friday, Weekends & Holidays.   Ask for the Neuro-Interventional doctor on call.  Someone is on call 24 hrs/day  [facility] toll free number: [phone] Monday-Friday 8:00 am to 4:30 pm  [facility] Emergency Dept: [phone]        Additional Information About Your Visit       MyChart Information    [employer] gives you secure access to your electronic health record. If you see a primary care provider, you can also send messages to your care team and make appointments. If you have questions, please call your primary care clinic.  If you do not have a primary care provider, please call [phone] and they will assist you.       Care EveryWhere ID    This is  "your Care EveryWhere ID. This could be used by other organizations to access your [facility] medical records  [id number]       Your Vitals Were  Most recent update: 1/25/2021  3:49 PM    Blood Pressure   97/71 (BP Location: Left arm)          Pulse   93          Temperature   97.9  F (36.6  C) (Oral)          Respirations   16          Height   1.575 m (5' 2\")             Weight   49.9 kg (110 lb)    Pulse Oximetry   97%    BMI (Body Mass Index)   20.12 kg/m           Primary Care Provider Office Phone # Fax #    Lauri Beltrán [phone]      Equal Access to Services    [facility]: Hadii aad ku hadasho Soomaali, waaxda luqadaha, qaybta kaalmada adeegyada, annie garciain haykailan aderoxy bach lamaria luisa . So [facility] [phone].    ATENCIÓN: Si habla español, tiene a vallejo disposición servicios gratuitos de asistencia lingüística. Llame al [phone].    We comply with applicable federal and state civil rights laws, including the [place] Human Rights Act. We do not discriminate on the basis of race, color, creed, Gnosticist, national origin, marital status, age, disability, sex, sexual orientation, or gender identity.    If you would like an itemization of your charges they will now be available in [employer] 30 days after discharge. To access the itemized statements in [employer] go to billing/billing summary. From there select view account. There will be multiple tabs showing an overview of your account, detail, payments, and communications. From the communications tab you can see your monthly statements, your itemized statements, and any billing letters generated for your account. If you do not have a [employer] account and need help getting access please contact [employer] support at [phone].  If you would prefer to have your itemized statements mailed please contact our automated itemized bill request line at [phone] option  2.       Thank you!    Thank you for choosing [facility] for your care. Our " goal is always to provide you with excellent care. Hearing back from our patients is one way we can continue to improve our services. Please take a few minutes to complete the written survey that you may receive in the mail after you visit with us. Thank you!            Medication List      ASK your doctor about these medications          Morning Afternoon Evening Bedtime As Needed    acetaminophen 500 MG tablet  Also known as: TYLENOL  INSTRUCTIONS: Take 500 mg by mouth every 6 hours as needed for mild pain                     MULTIVITAMINS PO  INSTRUCTIONS: Take 2 tablets by mouth every morning                     Vitamin D (Cholecalciferol) 25 MCG (1000 UT) Tabs  INSTRUCTIONS: Take 2,000 Units by mouth every morning

## 2021-01-27 ENCOUNTER — PATIENT OUTREACH (OUTPATIENT)
Dept: NEUROLOGY | Facility: CLINIC | Age: 51
End: 2021-01-27

## 2021-01-27 NOTE — PROGRESS NOTES
Endovascular Surgical Neuroradiology - Post Discharge Call    Admit: 1/25/21    Discharge: 1/25/21    MD/Service: Dr. 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 Will contact patient closer to that time to schedule (message sent).     Spoke with patient. Doing well. Denies bleeding, drainage, pain, swelling, redness at puncture site.  No questions/concerns at this time. Patient aware of the plan. Will call in interim with any concerns. Lesa Montanez RN 1/27/2021 5:16 PM

## 2021-09-19 ENCOUNTER — HEALTH MAINTENANCE LETTER (OUTPATIENT)
Age: 51
End: 2021-09-19

## 2021-12-26 ASSESSMENT — ENCOUNTER SYMPTOMS
PALPITATIONS: 0
NAUSEA: 0
DIZZINESS: 0
NERVOUS/ANXIOUS: 0
DYSURIA: 0
ABDOMINAL PAIN: 0
ARTHRALGIAS: 0
HEMATURIA: 0
MYALGIAS: 0
HEARTBURN: 0
JOINT SWELLING: 0
FEVER: 0
WEAKNESS: 0
HEMATOCHEZIA: 0
CONSTIPATION: 0
DIARRHEA: 0
COUGH: 0
EYE PAIN: 0
PARESTHESIAS: 0
HEADACHES: 0
SHORTNESS OF BREATH: 0
FREQUENCY: 0
CHILLS: 0
SORE THROAT: 0

## 2021-12-30 ENCOUNTER — OFFICE VISIT (OUTPATIENT)
Dept: FAMILY MEDICINE | Facility: CLINIC | Age: 51
End: 2021-12-30
Payer: COMMERCIAL

## 2021-12-30 VITALS
HEART RATE: 90 BPM | SYSTOLIC BLOOD PRESSURE: 122 MMHG | DIASTOLIC BLOOD PRESSURE: 78 MMHG | OXYGEN SATURATION: 100 % | WEIGHT: 113.5 LBS | RESPIRATION RATE: 16 BRPM | TEMPERATURE: 97.8 F | HEIGHT: 62 IN | BODY MASS INDEX: 20.89 KG/M2

## 2021-12-30 DIAGNOSIS — Z13.0 SCREENING, DEFICIENCY ANEMIA, IRON: ICD-10-CM

## 2021-12-30 DIAGNOSIS — Z12.11 SCREEN FOR COLON CANCER: ICD-10-CM

## 2021-12-30 DIAGNOSIS — Z13.220 SCREENING FOR HYPERLIPIDEMIA: ICD-10-CM

## 2021-12-30 DIAGNOSIS — Z12.5 SCREENING FOR MALIGNANT NEOPLASM OF PROSTATE: ICD-10-CM

## 2021-12-30 DIAGNOSIS — Z00.00 ROUTINE GENERAL MEDICAL EXAMINATION AT A HEALTH CARE FACILITY: Primary | ICD-10-CM

## 2021-12-30 DIAGNOSIS — Z13.1 SCREENING FOR DIABETES MELLITUS: ICD-10-CM

## 2021-12-30 LAB
ERYTHROCYTE [DISTWIDTH] IN BLOOD BY AUTOMATED COUNT: 12.3 % (ref 10–15)
HCT VFR BLD AUTO: 42.7 % (ref 40–53)
HGB BLD-MCNC: 14.4 G/DL (ref 13.3–17.7)
MCH RBC QN AUTO: 30.8 PG (ref 26.5–33)
MCHC RBC AUTO-ENTMCNC: 33.7 G/DL (ref 31.5–36.5)
MCV RBC AUTO: 91 FL (ref 78–100)
PLATELET # BLD AUTO: 186 10E3/UL (ref 150–450)
RBC # BLD AUTO: 4.67 10E6/UL (ref 4.4–5.9)
WBC # BLD AUTO: 6 10E3/UL (ref 4–11)

## 2021-12-30 PROCEDURE — 80053 COMPREHEN METABOLIC PANEL: CPT | Performed by: FAMILY MEDICINE

## 2021-12-30 PROCEDURE — 80061 LIPID PANEL: CPT | Performed by: FAMILY MEDICINE

## 2021-12-30 PROCEDURE — 99396 PREV VISIT EST AGE 40-64: CPT | Performed by: FAMILY MEDICINE

## 2021-12-30 PROCEDURE — 36415 COLL VENOUS BLD VENIPUNCTURE: CPT | Performed by: FAMILY MEDICINE

## 2021-12-30 PROCEDURE — G0103 PSA SCREENING: HCPCS | Performed by: FAMILY MEDICINE

## 2021-12-30 PROCEDURE — 85027 COMPLETE CBC AUTOMATED: CPT | Performed by: FAMILY MEDICINE

## 2021-12-30 ASSESSMENT — ENCOUNTER SYMPTOMS
HEMATOCHEZIA: 0
CONSTIPATION: 0
FREQUENCY: 0
EYE PAIN: 0
JOINT SWELLING: 0
COUGH: 0
FEVER: 0
HEARTBURN: 0
CHILLS: 0
HEMATURIA: 0
MYALGIAS: 0
DIARRHEA: 0
DYSURIA: 0
WEAKNESS: 0
NERVOUS/ANXIOUS: 0
DIZZINESS: 0
SHORTNESS OF BREATH: 0
ABDOMINAL PAIN: 0
SORE THROAT: 0
PALPITATIONS: 0
PARESTHESIAS: 0
ARTHRALGIAS: 0
HEADACHES: 0
NAUSEA: 0

## 2021-12-30 ASSESSMENT — MIFFLIN-ST. JEOR: SCORE: 1239.08

## 2021-12-30 NOTE — PROGRESS NOTES
SUBJECTIVE:   CC: Lizz Recinos is an 51 year old male who presents for preventative health visit.       Patient has been advised of split billing requirements and indicates understanding: Yes     Healthy Habits:     Getting at least 3 servings of Calcium per day:  Yes    Bi-annual eye exam:  Yes    Dental care twice a year:  NO    Sleep apnea or symptoms of sleep apnea:  None    Diet:  Regular (no restrictions)    Frequency of exercise:  1 day/week    Duration of exercise:  Less than 15 minutes    Taking medications regularly:  Yes    Medication side effects:  Not applicable    PHQ-2 Total Score: 0    Additional concerns today:  No    Today's PHQ-2 Score:   PHQ-2 ( 1999 [employer]) 12/26/2021   Q1: Little interest or pleasure in doing things 0   Q2: Feeling down, depressed or hopeless 0   PHQ-2 Score 0   PHQ-2 Total Score (12-17 Years)- Positive if 3 or more points; Administer PHQ-A if positive -   Q1: Little interest or pleasure in doing things Not at all   Q2: Feeling down, depressed or hopeless Not at all   PHQ-2 Score 0       Abuse: Current or Past(Physical, Sexual or Emotional)- No  Do you feel safe in your environment? Yes    Have you ever done Advance Care Planning? (For example, a Health Directive, POLST, or a discussion with a medical provider or your loved ones about your wishes): No, advance care planning information given to patient to review.  Patient declined advance care planning discussion at this time.    Social History     Tobacco Use     Smoking status: Never Smoker     Smokeless tobacco: Never Used   Substance Use Topics     Alcohol use: Not Currently     Alcohol/week: 1.7 standard drinks     Types: 2 Standard drinks or equivalent per week     If you drink alcohol do you typically have >3 drinks per day or >7 drinks per week? No    No flowsheet data found.    Last PSA:   PSA   Date Value Ref Range Status   08/28/2015 0.81 0 - 4 ug/L Final       Reviewed orders with patient. Reviewed health  "maintenance and updated orders accordingly - Yes    Reviewed and updated as needed this visit by clinical staff  Tobacco  Allergies  Meds  Problems  Med Hx  Surg Hx  Fam Hx         Reviewed and updated as needed this visit by Provider  Tobacco  Allergies  Meds  Problems  Med Hx  Surg Hx  Fam Hx          Review of Systems   Constitutional: Negative for chills and fever.   HENT: Negative for congestion, ear pain, hearing loss and sore throat.    Eyes: Negative for pain and visual disturbance.   Respiratory: Negative for cough and shortness of breath.    Cardiovascular: Negative for chest pain, palpitations and peripheral edema.   Gastrointestinal: Negative for abdominal pain, constipation, diarrhea, heartburn, hematochezia and nausea.   Genitourinary: Negative for dysuria, frequency, genital sores, hematuria, impotence, penile discharge and urgency.   Musculoskeletal: Negative for arthralgias, joint swelling and myalgias.   Skin: Negative for rash.   Neurological: Negative for dizziness, weakness, headaches and paresthesias.   Psychiatric/Behavioral: Negative for mood changes. The patient is not nervous/anxious.        OBJECTIVE:   /78   Pulse 90   Temp 97.8  F (36.6  C) (Tympanic)   Resp 16   Ht 1.575 m (5' 2\")   Wt 51.5 kg (113 lb 8 oz)   SpO2 100%   BMI 20.76 kg/m    EXAM:  GENERAL: healthy, alert and no distress  EYES: Eyes grossly normal to inspection, PERRL and conjunctivae and sclerae normal  HENT: ear canals and TM's normal, nose and mouth without ulcers or lesions  NECK: no adenopathy, no asymmetry, masses, or scars and thyroid normal to palpation  RESP: lungs clear to auscultation - no rales, rhonchi or wheezes  BREAST: normal without masses, tenderness or nipple discharge and no palpable axillary masses or adenopathy  CV: regular rate and rhythm, normal S1 S2, no S3 or S4, no murmur, click or rub, no peripheral edema and peripheral pulses strong  ABDOMEN: soft, nontender, no " "hepatosplenomegaly, no masses and bowel sounds normal   (male): normal male genitalia without lesions or urethral discharge, no hernia  MS: no gross musculoskeletal defects noted, no edema  SKIN: no suspicious lesions or rashes  NEURO: Normal strength and tone, mentation intact and speech normal  PSYCH: mentation appears normal, affect normal/bright  LYMPH: no cervical, supraclavicular, axillary, or inguinal adenopathy      ASSESSMENT/PLAN:   Routine general medical examination at a health care facility      Screening for hyperlipidemia    - Lipid panel reflex to direct LDL Fasting    Screening for diabetes mellitus    - Comprehensive metabolic panel (BMP + Alb, Alk Phos, ALT, AST, Total. Bili, TP)    Screening, deficiency anemia, iron    - CBC with platelets    Screening for malignant neoplasm of prostate    - PROSTATE SPEC ANTIGEN SCREEN    Screen for colon cancer    - Adult Gastro Ref - Procedure Only      COUNSELING:  Reviewed preventive health counseling, as reflected in patient instructions      Estimated body mass index is 20.76 kg/m  as calculated from the following:    Height as of this encounter: 1.575 m (5' 2\").    Weight as of this encounter: 51.5 kg (113 lb 8 oz).       reports that he has never smoked. He has never used smokeless tobacco.      Return in about 1 year (around 12/30/2022) for wellness exam with fasting labs with Armond Beltrán MD.         Armond Beltrán MD     [address] [address]  [email or web address]     Office: [phone]       "

## 2021-12-31 LAB
ALBUMIN SERPL-MCNC: 4 G/DL (ref 3.4–5)
ALP SERPL-CCNC: 57 U/L (ref 40–150)
ALT SERPL W P-5'-P-CCNC: 17 U/L (ref 0–70)
ANION GAP SERPL CALCULATED.3IONS-SCNC: 5 MMOL/L (ref 3–14)
AST SERPL W P-5'-P-CCNC: 11 U/L (ref 0–45)
BILIRUB SERPL-MCNC: 0.5 MG/DL (ref 0.2–1.3)
BUN SERPL-MCNC: 11 MG/DL (ref 7–30)
CALCIUM SERPL-MCNC: 8.9 MG/DL (ref 8.5–10.1)
CHLORIDE BLD-SCNC: 105 MMOL/L (ref 94–109)
CHOLEST SERPL-MCNC: 188 MG/DL
CO2 SERPL-SCNC: 29 MMOL/L (ref 20–32)
CREAT SERPL-MCNC: 0.74 MG/DL (ref 0.66–1.25)
FASTING STATUS PATIENT QL REPORTED: YES
GFR SERPL CREATININE-BSD FRML MDRD: >90 ML/MIN/1.73M2
GLUCOSE BLD-MCNC: 93 MG/DL (ref 70–99)
HDLC SERPL-MCNC: 61 MG/DL
LDLC SERPL CALC-MCNC: 112 MG/DL
NONHDLC SERPL-MCNC: 127 MG/DL
POTASSIUM BLD-SCNC: 4.1 MMOL/L (ref 3.4–5.3)
PROT SERPL-MCNC: 8 G/DL (ref 6.8–8.8)
PSA SERPL-MCNC: 0.81 UG/L (ref 0–4)
SODIUM SERPL-SCNC: 139 MMOL/L (ref 133–144)
TRIGL SERPL-MCNC: 74 MG/DL

## 2022-01-03 NOTE — RESULT ENCOUNTER NOTE
Dear Lizz,    Here is a summary of your recent test results:  -Normal red blood cell (hgb) levels, normal white blood cell count and normal platelet levels.  -PSA (prostate specific antigen) test is normal.  This indicates a low likelihood of prostate cancer.  ADVISE: rechecking this in 1 year.  -Cholesterol levels (LDL,HDL, Triglycerides) are normal.  ADVISE: rechecking in 1 year.   -Liver and gallbladder tests are normal (ALT,AST, Alk phos, bilirubin), kidney function is normal (Cr, GFR), sodium is normal, potassium is normal, calcium is normal, glucose is normal.    For additional lab test information, labtestsonline.org is an excellent reference.    In addition, here is a list of due or overdue Health Maintenance reminders:  Zoster (Shingles) Vaccine(1 of 2) Never done  Colorectal Cancer Screening due on 04/20/2020    Please call us at [phone] (or use [employer]) to address the above recommendations if needed.           Thank you very much for trusting me and [facility].     Have a peaceful day.    Healthy regards,  Armond Beltrán MD

## 2022-05-10 NOTE — NURSING NOTE
Chief Complaint   Patient presents with     New Patient     [facility] NEW AVM       Mohini Solano, EMT   Complex Repair And Graft Additional Text (Will Appearing After The Standard Complex Repair Text): The complex repair was not sufficient to completely close the primary defect. The remaining additional defect was repaired with the graft mentioned below.

## 2022-11-21 ENCOUNTER — HEALTH MAINTENANCE LETTER (OUTPATIENT)
Age: 52
End: 2022-11-21

## 2023-03-22 NOTE — PROGRESS NOTES
See dictated note.  
Service Date: 2019      Lauri Beltrán MD   [address]  [address]      RE: Sonya Recinos   MRN: [id number]   : 1970      Dear Dr. Beltrán:      We [facility] Rec[facility] for followup of his recent cerebral angiogram.  We performed the angiogram last week to evaluate his intracranial AVM.  He tolerated the procedure well.  He has some mild discomfort at the right groin puncture site.      We went over his angiogram and MRI with him and his wife.  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  It has numerous superficial cortical draining veins.  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      This 30-minute visit was spent discussing management of this grade 1 AVM.  We believe that this AVM can be treated safely with either surgical resection or radiosurgery.  Given his young age and the feasibility of treatment, we recommend treatment over observation.  Because of its low grade, we recommended surgical resection as the best option with radiosurgery also being a reasonable choice.  We went over the advantages and disadvantages of each.  He understands we are ranking surgical resection first followed by radiosurgery second.  He will review these options and we will contact him in the next 2 weeks.  We do not have any activity restrictions for him.      Please do not hesitate to contact us with questions.      Sincerely,         ANNMARIE NERI MD             D: 2019   T: 2019   MT: marianela      Name:     SONYA RECINOS   MRN:      [id number]        Account:      [id number]   :      1970           Service Date: 2019      Document: [id number]    
.

## 2023-04-16 ENCOUNTER — HEALTH MAINTENANCE LETTER (OUTPATIENT)
Age: 53
End: 2023-04-16

## 2023-07-03 ASSESSMENT — ENCOUNTER SYMPTOMS
HEMATOCHEZIA: 0
JOINT SWELLING: 0
COUGH: 0
CONSTIPATION: 0
MYALGIAS: 0
SORE THROAT: 0
ARTHRALGIAS: 0
WEAKNESS: 0
FREQUENCY: 0
NAUSEA: 0
HEADACHES: 0
EYE PAIN: 0
DIZZINESS: 0
DIARRHEA: 0
FEVER: 0
HEMATURIA: 0
DYSURIA: 0
PARESTHESIAS: 0
PALPITATIONS: 0
ABDOMINAL PAIN: 0
HEARTBURN: 0
CHILLS: 0
SHORTNESS OF BREATH: 0
NERVOUS/ANXIOUS: 0

## 2023-07-10 ENCOUNTER — OFFICE VISIT (OUTPATIENT)
Dept: FAMILY MEDICINE | Facility: CLINIC | Age: 53
End: 2023-07-10
Payer: COMMERCIAL

## 2023-07-10 VITALS
HEIGHT: 61 IN | OXYGEN SATURATION: 99 % | HEART RATE: 89 BPM | SYSTOLIC BLOOD PRESSURE: 120 MMHG | BODY MASS INDEX: 21.71 KG/M2 | TEMPERATURE: 97.8 F | DIASTOLIC BLOOD PRESSURE: 72 MMHG | RESPIRATION RATE: 13 BRPM | WEIGHT: 115 LBS

## 2023-07-10 DIAGNOSIS — Z13.220 SCREENING FOR LIPID DISORDERS: ICD-10-CM

## 2023-07-10 DIAGNOSIS — Z12.11 SCREEN FOR COLON CANCER: ICD-10-CM

## 2023-07-10 DIAGNOSIS — Q28.2 ARTERIOVENOUS MALFORMATION, CEREBRAL: ICD-10-CM

## 2023-07-10 DIAGNOSIS — Z12.5 SCREENING FOR MALIGNANT NEOPLASM OF PROSTATE: ICD-10-CM

## 2023-07-10 DIAGNOSIS — Z00.00 ROUTINE GENERAL MEDICAL EXAMINATION AT A HEALTH CARE FACILITY: Primary | ICD-10-CM

## 2023-07-10 DIAGNOSIS — J32.9 SINUSITIS, UNSPECIFIED CHRONICITY, UNSPECIFIED LOCATION: ICD-10-CM

## 2023-07-10 DIAGNOSIS — Z12.5 SCREENING FOR PROSTATE CANCER: ICD-10-CM

## 2023-07-10 DIAGNOSIS — Z13.1 SCREENING FOR DIABETES MELLITUS: ICD-10-CM

## 2023-07-10 DIAGNOSIS — Z13.0 SCREENING, DEFICIENCY ANEMIA, IRON: ICD-10-CM

## 2023-07-10 PROCEDURE — 99396 PREV VISIT EST AGE 40-64: CPT | Performed by: FAMILY MEDICINE

## 2023-07-10 ASSESSMENT — ENCOUNTER SYMPTOMS
CHILLS: 0
WEAKNESS: 0
ABDOMINAL PAIN: 0
CONSTIPATION: 0
DIARRHEA: 0
JOINT SWELLING: 0
NERVOUS/ANXIOUS: 0
HEMATOCHEZIA: 0
FREQUENCY: 0
SHORTNESS OF BREATH: 0
EYE PAIN: 0
ARTHRALGIAS: 0
COUGH: 0
SORE THROAT: 0
PARESTHESIAS: 0
HEADACHES: 0
HEARTBURN: 0
DIZZINESS: 0
MYALGIAS: 0
HEMATURIA: 0
NAUSEA: 0
FEVER: 0
DYSURIA: 0
PALPITATIONS: 0

## 2023-07-10 NOTE — PROGRESS NOTES
SUBJECTIVE:   CC: Lizz is an 52 year old who presents for preventative health visit.       7/10/2023     3:18 PM   Additional Questions   Roomed by Jay HARRIS CMA     Healthy Habits:     Getting at least 3 servings of Calcium per day:  Yes    Bi-annual eye exam:  Yes    Dental care twice a year:  Yes    Sleep apnea or symptoms of sleep apnea:  None    Diet:  Regular (no restrictions)    Frequency of exercise:  2-3 days/week    Duration of exercise:  Less than 15 minutes    Taking medications regularly:  Yes    Medication side effects:  None    Additional concerns today:  No      Today's PHQ-2 Score:       7/10/2023     3:10 PM   PHQ-2 ( 1999 Pfizer)   Q1: Little interest or pleasure in doing things 0   Q2: Feeling down, depressed or hopeless 0   PHQ-2 Score 0   Q1: Little interest or pleasure in doing things Not at all   Q2: Feeling down, depressed or hopeless Not at all   PHQ-2 Score 0           Social History     Tobacco Use     Smoking status: Never     Smokeless tobacco: Never   Substance Use Topics     Alcohol use: Not Currently     Alcohol/week: 1.7 standard drinks of alcohol     Types: 2 Standard drinks or equivalent per week             7/3/2023     2:17 PM   Alcohol Use   Prescreen: >3 drinks/day or >7 drinks/week? Yes   AUDIT SCORE  4         7/3/2023     2:17 PM   AUDIT - Alcohol Use Disorders Identification Test - Reproduced from the World Health Organization Audit 2001 (Second Edition)   1.  How often do you have a drink containing alcohol? 2 to 4 times a month   2.  How many drinks containing alcohol do you have on a typical day when you are drinking? 1 or 2   3.  How often do you have five or more drinks on one occasion? Monthly   4.  How often during the last year have you found that you were not able to stop drinking once you had started? Never   5.  How often during the last year have you failed to do what was normally expected of you because of drinking? Never   6.  How often during the last year  have you needed a first drink in the morning to get yourself going after a heavy drinking session? Never   7.  How often during the last year have you had a feeling of guilt or remorse after drinking? Never   8.  How often during the last year have you been unable to remember what happened the night before because of your drinking? Never   9.  Have you or someone else been injured because of your drinking? No   10. Has a relative, friend, doctor or other health care worker been concerned about your drinking or suggested you cut down? No   TOTAL SCORE 4     Last PSA:   PSA   Date Value Ref Range Status   08/28/2015 0.81 0 - 4 ug/L Final     Prostate Specific Antigen Screen   Date Value Ref Range Status   12/30/2021 0.81 0.00 - 4.00 ug/L Final       Reviewed orders with patient. Reviewed health maintenance and updated orders accordingly - Yes      Reviewed and updated as needed this visit by clinical staff   Tobacco  Allergies  Meds  Problems  Med Hx  Surg Hx  Fam Hx        Reviewed and updated as needed this visit by Provider   Tobacco  Allergies  Meds  Problems  Med Hx  Surg Hx  Fam Hx           Review of Systems   Constitutional: Negative for chills and fever.   HENT: Positive for congestion. Negative for ear pain, hearing loss and sore throat.    Eyes: Negative for pain and visual disturbance.   Respiratory: Negative for cough and shortness of breath.    Cardiovascular: Negative for chest pain, palpitations and peripheral edema.   Gastrointestinal: Negative for abdominal pain, constipation, diarrhea, heartburn, hematochezia and nausea.   Genitourinary: Negative for dysuria, frequency, genital sores, hematuria, impotence, penile discharge and urgency.   Musculoskeletal: Negative for arthralgias, joint swelling and myalgias.   Skin: Negative for rash.   Neurological: Negative for dizziness, weakness, headaches and paresthesias.   Psychiatric/Behavioral: Negative for mood changes. The patient is not  "nervous/anxious.    foul smelling mucous from the nose.     OBJECTIVE:   /72 (BP Location: Left arm, Patient Position: Sitting, Cuff Size: Adult Regular)   Pulse 89   Temp 97.8  F (36.6  C) (Tympanic)   Resp 13   Ht 1.549 m (5' 1\")   Wt 52.2 kg (115 lb)   SpO2 99%   BMI 21.73 kg/m    EXAM:  GENERAL: healthy, alert and no distress  EYES: Eyes grossly normal to inspection, PERRL and conjunctivae and sclerae normal  HENT: ear canals and TM's normal, nose and mouth without ulcers or lesions  NECK: no adenopathy, no asymmetry, masses, or scars and thyroid normal to palpation  RESP: lungs clear to auscultation - no rales, rhonchi or wheezes  BREAST: normal without masses, tenderness or nipple discharge and no palpable axillary masses or adenopathy  CV: regular rate and rhythm, normal S1 S2, no S3 or S4, no murmur, click or rub, no peripheral edema and peripheral pulses strong  ABDOMEN: soft, nontender, no hepatosplenomegaly, no masses and bowel sounds normal   (male): normal male genitalia without lesions or urethral discharge, no hernia  MS: no gross musculoskeletal defects noted, no edema  SKIN: no suspicious lesions or rashes  NEURO: Normal strength and tone, mentation intact and speech normal  PSYCH: mentation appears normal, affect normal/bright  LYMPH: no cervical, supraclavicular, axillary, or inguinal adenopathy  RECTAL: declined exam    ASSESSMENT/PLAN:   Routine general medical examination at a health care facility      h/o Arteriovenous malformation, cerebral  MRI planned for 2026 January by neurosurg    Screen for colon cancer  - Colonoscopy Screening  Referral    Screening for prostate cancer  - PROSTATE SPEC ANTIGEN SCREEN    Sinusitis, unspecified chronicity, unspecified location  Foul-smelling discharge from nose and will treat for possible sinus infection  - amoxicillin-clavulanate (AUGMENTIN) 875-125 MG tablet  Dispense: 14 tablet; Refill: 0    Screening for diabetes mellitus  - " COMPREHENSIVE METABOLIC PANEL    Screening for malignant neoplasm of prostate    Screening for lipid disorders  - Lipid panel reflex to direct LDL Non-fasting    Screening, deficiency anemia, iron  - CBC with Platelets    COUNSELING:  Reviewed preventive health counseling, as reflected in patient instructions       reports that he has never smoked. He has never used smokeless tobacco.      Return in about 1 year (around 7/10/2024) for wellness exam with fasting labs with Armond Beltrán MD.    Follow-up Visit   Expected date:  Jul 10, 2024 (Approximate)      Follow Up Appointment Details:     Follow-up with whom?: Me    Follow-Up for what?: Adult Preventive    How?: In Person    Is this an as-needed follow-up?: No                       Armond Beltrán MD     [address] [address]  [email or web address]     Office: [phone]

## 2023-07-11 ENCOUNTER — LAB (OUTPATIENT)
Dept: LAB | Facility: CLINIC | Age: 53
End: 2023-07-11
Payer: COMMERCIAL

## 2023-07-11 DIAGNOSIS — Z13.0 SCREENING, DEFICIENCY ANEMIA, IRON: ICD-10-CM

## 2023-07-11 DIAGNOSIS — Z13.220 SCREENING FOR LIPID DISORDERS: ICD-10-CM

## 2023-07-11 DIAGNOSIS — Z13.1 SCREENING FOR DIABETES MELLITUS: ICD-10-CM

## 2023-07-11 DIAGNOSIS — Z12.5 SCREENING FOR PROSTATE CANCER: ICD-10-CM

## 2023-07-11 LAB
ERYTHROCYTE [DISTWIDTH] IN BLOOD BY AUTOMATED COUNT: 12.6 % (ref 10–15)
HCT VFR BLD AUTO: 40.8 % (ref 40–53)
HGB BLD-MCNC: 13.6 G/DL (ref 13.3–17.7)
MCH RBC QN AUTO: 31 PG (ref 26.5–33)
MCHC RBC AUTO-ENTMCNC: 33.3 G/DL (ref 31.5–36.5)
MCV RBC AUTO: 93 FL (ref 78–100)
PLATELET # BLD AUTO: 176 10E3/UL (ref 150–450)
RBC # BLD AUTO: 4.39 10E6/UL (ref 4.4–5.9)
WBC # BLD AUTO: 6.1 10E3/UL (ref 4–11)

## 2023-07-11 PROCEDURE — G0103 PSA SCREENING: HCPCS

## 2023-07-11 PROCEDURE — 80053 COMPREHEN METABOLIC PANEL: CPT

## 2023-07-11 PROCEDURE — 85027 COMPLETE CBC AUTOMATED: CPT

## 2023-07-11 PROCEDURE — 80061 LIPID PANEL: CPT

## 2023-07-11 PROCEDURE — 36415 COLL VENOUS BLD VENIPUNCTURE: CPT

## 2023-07-12 LAB
ALBUMIN SERPL BCG-MCNC: 4.4 G/DL (ref 3.5–5.2)
ALP SERPL-CCNC: 45 U/L (ref 40–129)
ALT SERPL W P-5'-P-CCNC: 12 U/L (ref 0–70)
ANION GAP SERPL CALCULATED.3IONS-SCNC: 11 MMOL/L (ref 7–15)
AST SERPL W P-5'-P-CCNC: 25 U/L (ref 0–45)
BILIRUB SERPL-MCNC: 0.6 MG/DL
BUN SERPL-MCNC: 10.2 MG/DL (ref 6–20)
CALCIUM SERPL-MCNC: 9.1 MG/DL (ref 8.6–10)
CHLORIDE SERPL-SCNC: 100 MMOL/L (ref 98–107)
CHOLEST SERPL-MCNC: 200 MG/DL
CREAT SERPL-MCNC: 0.72 MG/DL (ref 0.67–1.17)
DEPRECATED HCO3 PLAS-SCNC: 27 MMOL/L (ref 22–29)
GFR SERPL CREATININE-BSD FRML MDRD: >90 ML/MIN/1.73M2
GLUCOSE SERPL-MCNC: 91 MG/DL (ref 70–99)
HDLC SERPL-MCNC: 58 MG/DL
LDLC SERPL CALC-MCNC: 113 MG/DL
NONHDLC SERPL-MCNC: 142 MG/DL
POTASSIUM SERPL-SCNC: 4.1 MMOL/L (ref 3.4–5.3)
PROT SERPL-MCNC: 7.8 G/DL (ref 6.4–8.3)
PSA SERPL DL<=0.01 NG/ML-MCNC: 0.7 NG/ML (ref 0–3.5)
SODIUM SERPL-SCNC: 138 MMOL/L (ref 136–145)
TRIGL SERPL-MCNC: 145 MG/DL

## 2023-07-13 NOTE — RESULT ENCOUNTER NOTE
Dear Lizz,    Here is a summary of your recent test results:  -Normal red blood cell (hgb) levels, normal white blood cell count and normal platelet levels.  -PSA (prostate specific antigen) test is normal.  This indicates a low likelihood of prostate cancer.  ADVISE: rechecking this in 1 year.  -Lipid panel: The ASCVD risk score returns the percentage likelihood of a first time Atherosclerotic Cardiovascular Disease (ASCVD) event.    The 10-year ASCVD risk score (Sedrick DK, et al., 2019) is: 4%    Values used to calculate the score:      Age: 52 years      Sex: Male      Is Non- : No      Diabetic: No      Tobacco smoker: No      Systolic Blood Pressure: 120 mmHg      Is BP treated: No      HDL Cholesterol: 58 mg/dL      Total Cholesterol: 200 mg/dL    -4% of patients that have a similar cholesterol profile to you will have a stroke, heart attack or death (related to heart disease) within the next 10 years and that is considered a low risk and cholesterol lowering medications are not  recommended at this time (high risk is >10%, or >7.5% if other risk factors such has high blood pressure or other heart disease risk factors).    -Cholesterol levels (LDL,HDL, Triglycerides) are okay.  ADVISE: rechecking  in 1 year.  -Liver and gallbladder tests are normal (ALT,AST, Alk phos, bilirubin), kidney function is normal (Cr, GFR), sodium is normal, potassium is normal, calcium is normal, glucose is normal.    For additional lab test information, www.[employer].com is a very good reference.    In addition, here is a list of due or overdue Health Maintenance reminders:  Colorectal Cancer Screening due on 04/20/2020    Please call us at [phone] (or use [employer]) to address the above recommendations if needed.           Thank you very much for trusting me and [facility].     Have a peaceful day.    Healthy regards,  Armond Beltrán MD

## 2023-10-06 ENCOUNTER — TELEPHONE (OUTPATIENT)
Dept: GASTROENTEROLOGY | Facility: CLINIC | Age: 53
End: 2023-10-06
Payer: COMMERCIAL

## 2023-10-06 NOTE — TELEPHONE ENCOUNTER
"Endoscopy Scheduling Screen    Have you had a positive Covid test in the last 14 days?  No    Are you active on MyChart?   Yes    What insurance is in the chart?  Other:  [facility]    Ordering/Referring Provider:   SANDY MANDEL        (If ordering provider performs procedure, schedule with ordering provider unless otherwise instructed. )    BMI: Estimated body mass index is 21.73 kg/m  as calculated from the following:    Height as of 7/10/23: 1.549 m (5' 1\").    Weight as of 7/10/23: 52.2 kg (115 lb).     Sedation Ordered  moderate sedation.   If patient BMI > 50 do not schedule in [facility].    If patient BMI > 45 do not schedule at [facility].    Are you taking methadone or Suboxone?  No    Are you taking any prescription medications for pain 3 or more times per week?   No    Do you have a history of malignant hyperthermia or adverse reaction to anesthesia?  No    (Females) Are you currently pregnant?   No     Have you been diagnosed or told you have pulmonary hypertension?   No    Do you have an LVAD?  No    Have you been told you have moderate to severe sleep apnea?  No    Have you been told you have COPD, asthma, or any other lung disease?  No    Do you have any heart conditions?  No     Have you ever had an organ transplant?   No    Have you ever had or are you awaiting a heart or lung transplant?   No    Have you had a stroke or transient ischemic attack (TIA aka \"mini stroke\" in the last 6 months?   No    Have you been diagnosed with or been told you have cirrhosis of the liver?   No    Are you currently on dialysis?   No    Do you need assistance transferring?   No    BMI: Estimated body mass index is 21.73 kg/m  as calculated from the following:    Height as of 7/10/23: 1.549 m (5' 1\").    Weight as of 7/10/23: 52.2 kg (115 lb).     Is patients BMI > 40 and scheduling location [facility]?  No    Do you take an injectable medication for weight loss or diabetes (excluding insulin)?  No    Do you take the medication " Naltrexone?  No    Do you take blood thinners?  No       Prep   Are you currently on dialysis or do you have chronic kidney disease?  No    Do you have a diagnosis of diabetes?  No    Do you have a diagnosis of cystic fibrosis (CF)?  No    On a regular basis do you go 3 -5 days between bowel movements?  No    BMI > 40?  No    Preferred Pharmacy:    [facility] - [place], [place] - [address]  [address] [address]  Phone: [phone] Fax: [phone] Alternate Fax: [phone], [phone]      Final Scheduling Details   Colonoscopy prep sent?  Standard MiraLAX    Procedure scheduled  Lower endoscopic ultrasound (EUS)    Surgeon:  John     Date of procedure:  1/2/24     Pre-OP / PAC:   No - Not required for this site.    Location  [facility] - Patient preference.    Sedation   Moderate Sedation - Per order.      Patient Reminders:   You will receive a call from a Nurse to review instructions and health history.  This assessment must be completed prior to your procedure.  Failure to complete the Nurse assessment may result in the procedure being cancelled.      On the day of your procedure, please designate an adult(s) who can drive you home stay with you for the next 24 hours. The medicines used in the exam will make you sleepy. You will not be able to drive.      You cannot take public transportation, ride share services, or non-medical taxi service without a responsible caregiver.  Medical transport services are allowed with the requirement that a responsible caregiver will receive you at your destination.  We require that drivers and caregivers are confirmed prior to your procedure.

## 2024-01-02 ENCOUNTER — HOSPITAL ENCOUNTER (OUTPATIENT)
Facility: CLINIC | Age: 54
Discharge: HOME | End: 2024-01-02
Attending: INTERNAL MEDICINE | Admitting: INTERNAL MEDICINE
Payer: COMMERCIAL

## 2024-01-02 VITALS
OXYGEN SATURATION: 99 % | DIASTOLIC BLOOD PRESSURE: 78 MMHG | WEIGHT: 115 LBS | TEMPERATURE: 97.8 F | RESPIRATION RATE: 16 BRPM | BODY MASS INDEX: 21.71 KG/M2 | HEIGHT: 61 IN | HEART RATE: 89 BPM | SYSTOLIC BLOOD PRESSURE: 111 MMHG

## 2024-01-02 LAB — COLONOSCOPY: NORMAL

## 2024-01-02 PROCEDURE — 45378 DIAGNOSTIC COLONOSCOPY: CPT | Performed by: INTERNAL MEDICINE

## 2024-01-02 PROCEDURE — G0121 COLON CA SCRN NOT HI RSK IND: HCPCS | Performed by: INTERNAL MEDICINE

## 2024-01-02 PROCEDURE — G0500 MOD SEDAT ENDO SERVICE >5YRS: HCPCS | Performed by: INTERNAL MEDICINE

## 2024-01-02 PROCEDURE — 258N000003 HC RX IP 258 OP 636: Performed by: INTERNAL MEDICINE

## 2024-01-02 PROCEDURE — 250N000011 HC RX IP 250 OP 636: Performed by: INTERNAL MEDICINE

## 2024-01-02 RX ORDER — ONDANSETRON 2 MG/ML
4 INJECTION INTRAMUSCULAR; INTRAVENOUS
Status: DISCONTINUED | OUTPATIENT
Start: 2024-01-02 | End: 2024-01-02 | Stop reason: HOSPADM

## 2024-01-02 RX ORDER — PROCHLORPERAZINE MALEATE 10 MG
10 TABLET ORAL EVERY 6 HOURS PRN
Status: DISCONTINUED | OUTPATIENT
Start: 2024-01-02 | End: 2024-01-02 | Stop reason: HOSPADM

## 2024-01-02 RX ORDER — EPINEPHRINE 1 MG/ML
0.1 INJECTION, SOLUTION INTRAMUSCULAR; SUBCUTANEOUS
Status: DISCONTINUED | OUTPATIENT
Start: 2024-01-02 | End: 2024-01-02 | Stop reason: HOSPADM

## 2024-01-02 RX ORDER — NALOXONE HYDROCHLORIDE 0.4 MG/ML
0.2 INJECTION, SOLUTION INTRAMUSCULAR; INTRAVENOUS; SUBCUTANEOUS
Status: DISCONTINUED | OUTPATIENT
Start: 2024-01-02 | End: 2024-01-02 | Stop reason: HOSPADM

## 2024-01-02 RX ORDER — NALOXONE HYDROCHLORIDE 0.4 MG/ML
0.4 INJECTION, SOLUTION INTRAMUSCULAR; INTRAVENOUS; SUBCUTANEOUS
Status: DISCONTINUED | OUTPATIENT
Start: 2024-01-02 | End: 2024-01-02 | Stop reason: HOSPADM

## 2024-01-02 RX ORDER — FENTANYL CITRATE 50 UG/ML
50-100 INJECTION, SOLUTION INTRAMUSCULAR; INTRAVENOUS EVERY 5 MIN PRN
Status: DISCONTINUED | OUTPATIENT
Start: 2024-01-02 | End: 2024-01-02 | Stop reason: HOSPADM

## 2024-01-02 RX ORDER — SIMETHICONE 40MG/0.6ML
133 SUSPENSION, DROPS(FINAL DOSAGE FORM)(ML) ORAL
Status: DISCONTINUED | OUTPATIENT
Start: 2024-01-02 | End: 2024-01-02 | Stop reason: HOSPADM

## 2024-01-02 RX ORDER — LIDOCAINE 40 MG/G
CREAM TOPICAL
Status: DISCONTINUED | OUTPATIENT
Start: 2024-01-02 | End: 2024-01-02 | Stop reason: HOSPADM

## 2024-01-02 RX ORDER — ATROPINE SULFATE 0.1 MG/ML
1 INJECTION INTRAVENOUS
Status: DISCONTINUED | OUTPATIENT
Start: 2024-01-02 | End: 2024-01-02 | Stop reason: HOSPADM

## 2024-01-02 RX ORDER — ONDANSETRON 4 MG/1
4 TABLET, ORALLY DISINTEGRATING ORAL EVERY 6 HOURS PRN
Status: DISCONTINUED | OUTPATIENT
Start: 2024-01-02 | End: 2024-01-02 | Stop reason: HOSPADM

## 2024-01-02 RX ORDER — ONDANSETRON 2 MG/ML
4 INJECTION INTRAMUSCULAR; INTRAVENOUS EVERY 6 HOURS PRN
Status: DISCONTINUED | OUTPATIENT
Start: 2024-01-02 | End: 2024-01-02 | Stop reason: HOSPADM

## 2024-01-02 RX ORDER — FLUMAZENIL 0.1 MG/ML
0.2 INJECTION, SOLUTION INTRAVENOUS
Status: DISCONTINUED | OUTPATIENT
Start: 2024-01-02 | End: 2024-01-02 | Stop reason: HOSPADM

## 2024-01-02 RX ORDER — DIPHENHYDRAMINE HYDROCHLORIDE 50 MG/ML
25-50 INJECTION INTRAMUSCULAR; INTRAVENOUS
Status: DISCONTINUED | OUTPATIENT
Start: 2024-01-02 | End: 2024-01-02 | Stop reason: HOSPADM

## 2024-01-02 RX ADMIN — MIDAZOLAM 1 MG: 1 INJECTION INTRAMUSCULAR; INTRAVENOUS at 07:30

## 2024-01-02 RX ADMIN — MIDAZOLAM 1 MG: 1 INJECTION INTRAMUSCULAR; INTRAVENOUS at 07:25

## 2024-01-02 RX ADMIN — SODIUM CHLORIDE 500 ML: 9 INJECTION, SOLUTION INTRAVENOUS at 07:32

## 2024-01-02 RX ADMIN — FENTANYL CITRATE 50 MCG: 0.05 INJECTION, SOLUTION INTRAMUSCULAR; INTRAVENOUS at 07:30

## 2024-01-02 RX ADMIN — FENTANYL CITRATE 50 MCG: 0.05 INJECTION, SOLUTION INTRAMUSCULAR; INTRAVENOUS at 07:25

## 2024-01-02 ASSESSMENT — ACTIVITIES OF DAILY LIVING (ADL): ADLS_ACUITY_SCORE: 35

## 2024-01-02 NOTE — H&P
Pre-Endoscopy History and Physical     Lizz Recinos MRN# [id number]   YOB: 1970 Age: 53 year old     Date of Procedure: 1/2/2024  Primary care provider: Lauri Beltrán  Type of Endoscopy: Colonoscopy with possible biopsy, possible polypectomy  Reason for Procedure: screen  Type of Anesthesia Anticipated: Conscious Sedation    HPI:    Lizz is a 53 year old male who will be undergoing the above procedure.      A history and physical has been performed. The patient's medications and allergies have been reviewed. The risks and benefits of the procedure and the sedation options and risks were discussed with the patient.  All questions were answered and informed consent was obtained.      He denies a personal or family history of anesthesia complications or bleeding disorders.     Patient Active Problem List   Diagnosis    Vertigo    Arteriovenous malformation, cerebral        Past Medical History:   Diagnosis Date    CARDIOVASCULAR SCREENING; LDL GOAL LESS THAN 160         Past Surgical History:   Procedure Laterality Date    IR CAROTID CEREBRAL ANGIOGRAM BILATERAL  09/12/2019    IR CAROTID CEREBRAL ANGIOGRAM BILATERAL  12/27/2019    IR CAROTID CEREBRAL ANGIOGRAM BILATERAL  01/25/2021    OPTICAL TRACKING SYSTEM CRANIOTOMY, REPAIR ARTERIOVENOUS MALFORMATION, COMBINED Right 12/27/2019    Procedure: Stealth assisted right parietal craniotomy and resection of arteriovenous malformation, intraoperative cerebral angiography;  Surgeon: Carmen Whitney MD;  Location:  OR       Social History     Tobacco Use    Smoking status: Never    Smokeless tobacco: Never   Substance Use Topics    Alcohol use: Yes     Alcohol/week: 1.7 standard drinks of alcohol     Types: 2 Standard drinks or equivalent per week     Comment: occasionally       Family History   Problem Relation Age of Onset    No Known Problems Sister     No Known Problems Sister     No Known Problems Sister     No Known Problems Brother   "   No Known Problems Brother     No Known Problems Brother     No Known Problems Brother     No Known Problems Daughter     No Known Problems Son     No Known Problems Son     Colon Cancer No family hx of     Prostate Cancer No family hx of     Diabetes No family hx of     Coronary Artery Disease No family hx of     Cerebrovascular Disease No family hx of     Hypertension No family hx of     Breast Cancer No family hx of     Anesthesia Reaction No family hx of     Deep Vein Thrombosis No family hx of        Prior to Admission medications    Medication Sig Start Date End Date Taking? Authorizing Provider   acetaminophen (TYLENOL) 500 MG tablet Take 500 mg by mouth every 6 hours as needed for mild pain  Patient not taking: Reported on 12/26/2023    Reported, Patient   Multiple Vitamin (MULTIVITAMINS PO) Take 2 tablets by mouth every morning   Patient not taking: Reported on 12/26/2023    Reported, Patient   Vitamin D, Cholecalciferol, 25 MCG (1000 UT) TABS Take 2,000 Units by mouth every morning  Patient not taking: Reported on 7/10/2023    Reported, Patient       No Known Allergies     REVIEW OF SYSTEMS:   5 point ROS negative except as noted above in HPI, including Gen., Resp., CV, GI &  system review.    PHYSICAL EXAM:   There were no vitals taken for this visit. Estimated body mass index is 21.73 kg/m  as calculated from the following:    Height as of 7/10/23: 1.549 m (5' 1\").    Weight as of 7/10/23: 52.2 kg (115 lb).   GENERAL APPEARANCE: alert, and oriented  MENTAL STATUS: alert  AIRWAY EXAM: Mallampatti Class I (visualization of the soft palate, fauces, uvula, anterior and posterior pillars)  RESP: lungs clear to auscultation - no rales, rhonchi or wheezes  CV: regular rates and rhythm  DIAGNOSTICS:    Not indicated    IMPRESSION   ASA Class 2 - Mild systemic disease    PLAN:   Plan for Colonoscopy with possible biopsy, possible polypectomy. We discussed the risks, benefits and alternatives and the patient " wished to proceed.    The above has been forwarded to the consulting provider.      Signed Electronically by: Garrett Lopez MD  January 2, 2024

## 2024-07-12 SDOH — HEALTH STABILITY: PHYSICAL HEALTH: ON AVERAGE, HOW MANY DAYS PER WEEK DO YOU ENGAGE IN MODERATE TO STRENUOUS EXERCISE (LIKE A BRISK WALK)?: 3 DAYS

## 2024-07-12 SDOH — HEALTH STABILITY: PHYSICAL HEALTH: ON AVERAGE, HOW MANY MINUTES DO YOU ENGAGE IN EXERCISE AT THIS LEVEL?: 20 MIN

## 2024-07-12 ASSESSMENT — SOCIAL DETERMINANTS OF HEALTH (SDOH): HOW OFTEN DO YOU GET TOGETHER WITH FRIENDS OR RELATIVES?: ONCE A WEEK

## 2024-07-16 ENCOUNTER — OFFICE VISIT (OUTPATIENT)
Dept: FAMILY MEDICINE | Facility: CLINIC | Age: 54
End: 2024-07-16
Attending: FAMILY MEDICINE
Payer: COMMERCIAL

## 2024-07-16 VITALS
TEMPERATURE: 98.3 F | HEIGHT: 61 IN | WEIGHT: 116 LBS | RESPIRATION RATE: 16 BRPM | HEART RATE: 79 BPM | DIASTOLIC BLOOD PRESSURE: 72 MMHG | SYSTOLIC BLOOD PRESSURE: 124 MMHG | BODY MASS INDEX: 21.9 KG/M2 | OXYGEN SATURATION: 99 %

## 2024-07-16 DIAGNOSIS — Z12.5 SCREENING FOR PROSTATE CANCER: ICD-10-CM

## 2024-07-16 DIAGNOSIS — Z13.0 SCREENING, DEFICIENCY ANEMIA, IRON: ICD-10-CM

## 2024-07-16 DIAGNOSIS — Z00.00 ROUTINE GENERAL MEDICAL EXAMINATION AT A HEALTH CARE FACILITY: Primary | ICD-10-CM

## 2024-07-16 DIAGNOSIS — Z13.1 SCREENING FOR DIABETES MELLITUS: ICD-10-CM

## 2024-07-16 DIAGNOSIS — Z13.220 SCREENING FOR LIPID DISORDERS: ICD-10-CM

## 2024-07-16 DIAGNOSIS — Q28.2 ARTERIOVENOUS MALFORMATION, CEREBRAL: ICD-10-CM

## 2024-07-16 PROCEDURE — 99396 PREV VISIT EST AGE 40-64: CPT | Performed by: FAMILY MEDICINE

## 2024-07-16 NOTE — PATIENT INSTRUCTIONS
Patient Education   Preventive Care Advice   This is general advice given by our system to help you stay healthy. However, your care team may have specific advice just for you. Please talk to your care team about your preventive care needs.  Nutrition  Eat 5 or more servings of fruits and vegetables each day.  Try wheat bread, brown rice and whole grain pasta (instead of white bread, rice, and pasta).  Get enough calcium and vitamin D. Check the label on foods and aim for 100% of the RDA (recommended daily allowance).  Lifestyle  Exercise at least 150 minutes each week  (30 minutes a day, 5 days a week).  Do muscle strengthening activities 2 days a week. These help control your weight and prevent disease.  No smoking.  Wear sunscreen to prevent skin cancer.  Have a dental exam and cleaning every 6 months.  Yearly exams  See your health care team every year to talk about:  Any changes in your health.  Any medicines your care team has prescribed.  Preventive care, family planning, and ways to prevent chronic diseases.  Shots (vaccines)   HPV shots (up to age 26), if you've never had them before.  Hepatitis B shots (up to age 59), if you've never had them before.  COVID-19 shot: Get this shot when it's due.  Flu shot: Get a flu shot every year.  Tetanus shot: Get a tetanus shot every 10 years.  Pneumococcal, hepatitis A, and RSV shots: Ask your care team if you need these based on your risk.  Shingles shot (for age 50 and up)  General health tests  Diabetes screening:  Starting at age 35, Get screened for diabetes at least every 3 years.  If you are younger than age 35, ask your care team if you should be screened for diabetes.  Cholesterol test: At age 39, start having a cholesterol test every 5 years, or more often if advised.  Bone density scan (DEXA): At age 50, ask your care team if you should have this scan for osteoporosis (brittle bones).  Hepatitis C: Get tested at least once in your life.  STIs (sexually  transmitted infections)  Before age 24: Ask your care team if you should be screened for STIs.  After age 24: Get screened for STIs if you're at risk. You are at risk for STIs (including HIV) if:  You are sexually active with more than one person.  You don't use condoms every time.  You or a partner was diagnosed with a sexually transmitted infection.  If you are at risk for HIV, ask about PrEP medicine to prevent HIV.  Get tested for HIV at least once in your life, whether you are at risk for HIV or not.  Cancer screening tests  Cervical cancer screening: If you have a cervix, begin getting regular cervical cancer screening tests starting at age 21.  Breast cancer scan (mammogram): If you've ever had breasts, begin having regular mammograms starting at age 40. This is a scan to check for breast cancer.  Colon cancer screening: It is important to start screening for colon cancer at age 45.  Have a colonoscopy test every 10 years (or more often if you're at risk) Or, ask your provider about stool tests like a FIT test every year or Cologuard test every 3 years.  To learn more about your testing options, visit:   .  For help making a decision, visit:   https://bit.ly/jq59545.  Prostate cancer screening test: If you have a prostate, ask your care team if a prostate cancer screening test (PSA) at age 55 is right for you.  Lung cancer screening: If you are a current or former smoker ages 50 to 80, ask your care team if ongoing lung cancer screenings are right for you.  For informational purposes only. Not to replace the advice of your health care provider. Copyright   2023 [facility]. All rights reserved. Clinically reviewed by the [facility]. [employer] [id number] - REV 01/24.  Preventing Falls: Care Instructions  Injuries and health problems such as trouble walking or poor eyesight can increase your risk of falling. So can some medicines. But there are things you can do to help  "prevent falls. You can exercise to get stronger. You can also arrange your home to make it safer.    Talk to your doctor about the medicines you take. Ask if any of them increase the risk of falls and whether they can be changed or stopped.   Try to exercise regularly. It can help improve your strength and balance. This can help lower your risk of falling.     Practice fall safety and prevention.    Wear low-heeled shoes that fit well and give your feet good support. Talk to your doctor if you have foot problems that make this hard.  Carry a cellphone or wear a medical alert device that you can use to call for help.  Use stepladders instead of chairs to reach high objects. Don't climb if you're at risk for falls. Ask for help, if needed.  Wear the correct eyeglasses, if you need them.    Make your home safer.    Remove rugs, cords, clutter, and furniture from walkways.  Keep your house well lit. Use night-lights in hallways and bathrooms.  Install and use sturdy handrails on stairways.  Wear nonskid footwear, even inside. Don't walk barefoot or in socks without shoes.    Be safe outside.    Use handrails, curb cuts, and ramps whenever possible.  Keep your hands free by using a shoulder bag or backpack.  Try to walk in well-lit areas. Watch out for uneven ground, changes in pavement, and debris.  Be careful in the winter. Walk on the grass or gravel when sidewalks are slippery. Use de-icer on steps and walkways. Add non-slip devices to shoes.    Put grab bars and nonskid mats in your shower or tub and near the toilet. Try to use a shower chair or bath bench when bathing.   Get into a tub or shower by putting in your weaker leg first. Get out with your strong side first. Have a phone or medical alert device in the bathroom with you.   Where can you learn more?  Go to https://www.[employer].net/patiented  Enter G117 in the search box to learn more about \"Preventing Falls: Care Instructions.\"  Current as of: July 17, " 2023               Content Version: 14.0    4598-8451 [employer].   Care instructions adapted under license by your healthcare professional. If you have questions about a medical condition or this instruction, always ask your healthcare professional. [employer] disclaims any warranty or liability for your use of this information.

## 2024-07-16 NOTE — PROGRESS NOTES
Preventive Care Visit  [facility]  Lauri Beltrán MD, Family Medicine  Jul 16, 2024      Assessment & Plan   Routine general medical examination at a health care facility      Screening for prostate cancer  - PROSTATE SPEC ANTIGEN SCREEN    Screening for lipid disorders  - Lipid panel reflex to direct LDL Non-fasting    Screening, deficiency anemia, iron  - CBC with Platelets    Screening for diabetes mellitus  - COMPREHENSIVE METABOLIC PANEL    Arteriovenous malformation, cerebral  No symptoms  - Due for MRI in 2026      No follow-ups on file.   Follow-up Visit   Expected date: Jul 16, 2024      Follow Up Appointment Details:     Follow-up with whom?: Other Primary Care Services    Follow-Up for what?: Lab Visit    How?: In Person             Follow-up Visit   Expected date:  Jul 16, 2025 (Approximate)      Follow Up Appointment Details:     Follow-up with whom?: PCP    Follow-Up for what?: Adult Preventive    How?: In Person                         Armond Beltrán MD      [address] [address]  [email or web address]   Office: [phone]         Barrett Zamudio is a 53 year old, presenting for the following:  Physical        Health Care Directive  Patient does not have a Health Care Directive or Living Will: Discussed advance care planning with patient; however, patient declined at this time.    HPI        7/12/2024   General Health   How would you rate your overall physical health? Good   Feel stress (tense, anxious, or unable to sleep) Patient declined            7/12/2024   Nutrition   Three or more servings of calcium each day? Yes   Diet: I don't know   How many servings of fruit and vegetables per day? (!) 2-3   How many sweetened beverages each day? 0-1            7/12/2024   Exercise   Days per week of moderate/strenous exercise 3 days   Average minutes spent exercising at this level 20 min            7/12/2024   Social Factors    Frequency of gathering with friends or relatives Once a week   Worry food won't last until get money to buy more No   Food not last or not have enough money for food? No   Do you have housing? (Housing is defined as stable permanent housing and does not include staying ouside in a car, in a tent, in an abandoned building, in an overnight shelter, or couch-surfing.) Yes   Are you worried about losing your housing? No   Lack of transportation? No   Unable to get utilities (heat,electricity)? No            7/16/2024   Fall Risk   Gait Speed Test (Document in seconds) 3.83   Gait Speed Test Interpretation Less than or equal to 5.00 seconds - PASS             7/12/2024   Dental   Dentist two times every year? Yes            7/12/2024   TB Screening   Were you born outside of the [place]? Decline            Today's PHQ-2 Score:       7/16/2024    10:07 AM   PHQ-2 ( 1999 [employer])   Q1: Little interest or pleasure in doing things 0   Q2: Feeling down, depressed or hopeless 0   PHQ-2 Score 0   Q1: Little interest or pleasure in doing things Not at all   Q2: Feeling down, depressed or hopeless Not at all   PHQ-2 Score 0           7/12/2024   Substance Use   Alcohol more than 3/day or more than 7/wk No   Do you use any other substances recreationally? No        Social History     Tobacco Use    Smoking status: Never    Smokeless tobacco: Never   Vaping Use    Vaping status: Never Used   Substance Use Topics    Alcohol use: Yes     Alcohol/week: 1.7 standard drinks of alcohol     Types: 2 Standard drinks or equivalent per week     Comment: occasionally    Drug use: No           7/12/2024   STI Screening   New sexual partner(s) since last STI/HIV test? No      Last PSA:   PSA   Date Value Ref Range Status   08/28/2015 0.81 0 - 4 ug/L Final     Prostate Specific Antigen Screen   Date Value Ref Range Status   07/11/2023 0.70 0.00 - 3.50 ng/mL Final   12/30/2021 0.81 0.00 - 4.00 ug/L Final     ASCVD Risk   The 10-year ASCVD  "risk score (Sedrick MICHEL, et al., 2019) is: 4.7%    Values used to calculate the score:      Age: 53 years      Sex: Male      Is Non- : No      Diabetic: No      Tobacco smoker: No      Systolic Blood Pressure: 124 mmHg      Is BP treated: No      HDL Cholesterol: 58 mg/dL      Total Cholesterol: 200 mg/dL           Reviewed and updated as needed this visit by Provider   Tobacco  Allergies  Meds  Problems  Med Hx  Surg Hx  Fam Hx             Objective    Exam  /72   Pulse 79   Temp 98.3  F (36.8  C) (Tympanic)   Resp 16   Ht 1.549 m (5' 1\")   Wt 52.6 kg (116 lb)   SpO2 99%   BMI 21.92 kg/m     Estimated body mass index is 21.92 kg/m  as calculated from the following:    Height as of this encounter: 1.549 m (5' 1\").    Weight as of this encounter: 52.6 kg (116 lb).    Physical Exam  GENERAL: healthy, alert and no distress  EYES: Eyes grossly normal to inspection, PERRL and conjunctivae and sclerae normal  HENT: ear canals and TM's normal, nose and mouth without ulcers or lesions  NECK: no adenopathy, no asymmetry, masses, or scars and thyroid normal to palpation  RESP: lungs clear to auscultation - no rales, rhonchi or wheezes  BREAST: normal without masses, tenderness or nipple discharge and no palpable axillary masses or adenopathy  CV: regular rate and rhythm, normal S1 S2, no S3 or S4, no murmur, click or rub, no peripheral edema and peripheral pulses strong  ABDOMEN: soft, nontender, no hepatosplenomegaly, no masses and bowel sounds normal   (male): normal male genitalia without lesions or urethral discharge, no hernia  MS: no gross musculoskeletal defects noted, no edema  SKIN: no suspicious lesions or rashes  NEURO: Normal strength and tone, mentation intact and speech normal  PSYCH: mentation appears normal, affect normal/bright  LYMPH: no cervical, supraclavicular, axillary, or inguinal adenopathy   RECTAL: declined exam      Signed Electronically by: Lauri RICHARD" MD Leigh Ann

## 2024-07-18 ENCOUNTER — LAB (OUTPATIENT)
Dept: LAB | Facility: CLINIC | Age: 54
End: 2024-07-18
Attending: FAMILY MEDICINE
Payer: COMMERCIAL

## 2024-07-18 DIAGNOSIS — Z13.220 SCREENING FOR LIPID DISORDERS: ICD-10-CM

## 2024-07-18 DIAGNOSIS — Z12.5 SCREENING FOR PROSTATE CANCER: ICD-10-CM

## 2024-07-18 DIAGNOSIS — Z13.0 SCREENING, DEFICIENCY ANEMIA, IRON: ICD-10-CM

## 2024-07-18 DIAGNOSIS — Z13.1 SCREENING FOR DIABETES MELLITUS: ICD-10-CM

## 2024-07-18 LAB
ERYTHROCYTE [DISTWIDTH] IN BLOOD BY AUTOMATED COUNT: 13 % (ref 10–15)
HCT VFR BLD AUTO: 39.4 % (ref 40–53)
HGB BLD-MCNC: 13.1 G/DL (ref 13.3–17.7)
MCH RBC QN AUTO: 31.2 PG (ref 26.5–33)
MCHC RBC AUTO-ENTMCNC: 33.2 G/DL (ref 31.5–36.5)
MCV RBC AUTO: 94 FL (ref 78–100)
PLATELET # BLD AUTO: 175 10E3/UL (ref 150–450)
RBC # BLD AUTO: 4.2 10E6/UL (ref 4.4–5.9)
WBC # BLD AUTO: 5.3 10E3/UL (ref 4–11)

## 2024-07-18 PROCEDURE — 80061 LIPID PANEL: CPT

## 2024-07-18 PROCEDURE — 80053 COMPREHEN METABOLIC PANEL: CPT

## 2024-07-18 PROCEDURE — 36415 COLL VENOUS BLD VENIPUNCTURE: CPT

## 2024-07-18 PROCEDURE — G0103 PSA SCREENING: HCPCS

## 2024-07-18 PROCEDURE — 85027 COMPLETE CBC AUTOMATED: CPT

## 2024-07-19 LAB
ALBUMIN SERPL BCG-MCNC: 4.1 G/DL (ref 3.5–5.2)
ALP SERPL-CCNC: 41 U/L (ref 40–150)
ALT SERPL W P-5'-P-CCNC: 11 U/L (ref 0–70)
ANION GAP SERPL CALCULATED.3IONS-SCNC: 9 MMOL/L (ref 7–15)
AST SERPL W P-5'-P-CCNC: 21 U/L (ref 0–45)
BILIRUB SERPL-MCNC: 0.5 MG/DL
BUN SERPL-MCNC: 15.7 MG/DL (ref 6–20)
CALCIUM SERPL-MCNC: 8.9 MG/DL (ref 8.8–10.4)
CHLORIDE SERPL-SCNC: 101 MMOL/L (ref 98–107)
CHOLEST SERPL-MCNC: 175 MG/DL
CREAT SERPL-MCNC: 0.79 MG/DL (ref 0.67–1.17)
EGFRCR SERPLBLD CKD-EPI 2021: >90 ML/MIN/1.73M2
FASTING STATUS PATIENT QL REPORTED: YES
FASTING STATUS PATIENT QL REPORTED: YES
GLUCOSE SERPL-MCNC: 83 MG/DL (ref 70–99)
HCO3 SERPL-SCNC: 28 MMOL/L (ref 22–29)
HDLC SERPL-MCNC: 59 MG/DL
LDLC SERPL CALC-MCNC: 99 MG/DL
NONHDLC SERPL-MCNC: 116 MG/DL
POTASSIUM SERPL-SCNC: 4.4 MMOL/L (ref 3.4–5.3)
PROT SERPL-MCNC: 7.4 G/DL (ref 6.4–8.3)
PSA SERPL DL<=0.01 NG/ML-MCNC: 1.09 NG/ML (ref 0–3.5)
SODIUM SERPL-SCNC: 138 MMOL/L (ref 135–145)
TRIGL SERPL-MCNC: 86 MG/DL

## 2024-07-19 NOTE — RESULT ENCOUNTER NOTE
Dear Lizz,    Here is a summary of your recent test results:  -essentially normal red blood cell (hgb) levels, normal white blood cell count and normal platelet levels.  -PSA (prostate specific antigen) test is normal.  This indicates a low likelihood of prostate cancer.  ADVISE: rechecking this in 1 year.  -Lipid panel: The ASCVD risk score returns the percentage likelihood of a first time Atherosclerotic Cardiovascular Disease (ASCVD) event.    The 10-year ASCVD risk score (Sedrick DK, et al., 2019) is: 3.9%    Values used to calculate the score:      Age: 53 years      Sex: Male      Is Non- : No      Diabetic: No      Tobacco smoker: No      Systolic Blood Pressure: 124 mmHg      Is BP treated: No      HDL Cholesterol: 59 mg/dL      Total Cholesterol: 175 mg/dL    -3.9% of patients that have a similar cholesterol profile to you will have a stroke, heart attack or death (related to heart disease) within the next 10 years and that is considered a low risk and cholesterol lowering medications are not  recommended at this time (high risk is >10%, or >7.5% if other risk factors such has high blood pressure or other heart disease risk factors).   -Cholesterol levels (LDL,HDL, Triglycerides) are normal.  ADVISE: rechecking in 1 year.   -Liver and gallbladder tests are normal (ALT,AST, Alk phos, bilirubin), kidney function is normal (Cr, GFR), sodium is normal, potassium is normal, calcium is normal, glucose is normal.    For additional lab test information, www.[employer].com is a very good reference.          Thank you very much for trusting me and [employer].     Have a peaceful day.    Healthy regards,  Armond Beltrán MD

## 2024-10-24 ENCOUNTER — IMMUNIZATION (OUTPATIENT)
Dept: FAMILY MEDICINE | Facility: CLINIC | Age: 54
End: 2024-10-24
Payer: COMMERCIAL

## 2024-10-24 DIAGNOSIS — Z23 ENCOUNTER FOR IMMUNIZATION: Primary | ICD-10-CM

## 2024-10-24 PROCEDURE — 90480 ADMN SARSCOV2 VAC 1/ONLY CMP: CPT

## 2024-10-24 PROCEDURE — 91320 SARSCV2 VAC 30MCG TRS-SUC IM: CPT

## 2024-10-24 PROCEDURE — 90673 RIV3 VACCINE NO PRESERV IM: CPT

## 2024-10-24 PROCEDURE — 90471 IMMUNIZATION ADMIN: CPT

## 2024-10-24 PROCEDURE — 99207 PR NO CHARGE NURSE ONLY: CPT

## 2024-10-24 NOTE — PROGRESS NOTES
Prior to immunization administration, verified patients identity using patient s name and date of birth. Please see Immunization Activity for additional information.     Is the patient's temperature normal (100.5 or less)? Yes     Patient MEETS CRITERIA. PROCEED with vaccine administration.      Patient instructed to remain in clinic for 15 minutes afterwards, and to report any adverse reactions.      Link to Ancillary Visit Immunization Standing Orders SmartSet     Screening performed by Katlyn Strickland CMA on 10/24/2024 at 3:26 PM.

## 2025-07-12 SDOH — HEALTH STABILITY: PHYSICAL HEALTH: ON AVERAGE, HOW MANY DAYS PER WEEK DO YOU ENGAGE IN MODERATE TO STRENUOUS EXERCISE (LIKE A BRISK WALK)?: 3 DAYS

## 2025-07-12 SDOH — HEALTH STABILITY: PHYSICAL HEALTH: ON AVERAGE, HOW MANY MINUTES DO YOU ENGAGE IN EXERCISE AT THIS LEVEL?: 10 MIN

## 2025-07-17 ENCOUNTER — OFFICE VISIT (OUTPATIENT)
Dept: FAMILY MEDICINE | Facility: CLINIC | Age: 55
End: 2025-07-17
Attending: FAMILY MEDICINE
Payer: COMMERCIAL

## 2025-07-17 VITALS
HEART RATE: 83 BPM | RESPIRATION RATE: 18 BRPM | SYSTOLIC BLOOD PRESSURE: 128 MMHG | DIASTOLIC BLOOD PRESSURE: 78 MMHG | OXYGEN SATURATION: 100 % | WEIGHT: 115 LBS | BODY MASS INDEX: 21.71 KG/M2 | TEMPERATURE: 96.7 F | HEIGHT: 61 IN

## 2025-07-17 DIAGNOSIS — Z13.1 SCREENING FOR DIABETES MELLITUS: ICD-10-CM

## 2025-07-17 DIAGNOSIS — Z12.5 SCREENING FOR PROSTATE CANCER: ICD-10-CM

## 2025-07-17 DIAGNOSIS — Z13.6 SCREENING FOR CARDIOVASCULAR CONDITION: ICD-10-CM

## 2025-07-17 DIAGNOSIS — Z00.00 ROUTINE GENERAL MEDICAL EXAMINATION AT A HEALTH CARE FACILITY: Primary | ICD-10-CM

## 2025-07-17 DIAGNOSIS — Z13.0 SCREENING, DEFICIENCY ANEMIA, IRON: ICD-10-CM

## 2025-07-17 LAB
ERYTHROCYTE [DISTWIDTH] IN BLOOD BY AUTOMATED COUNT: 12.9 % (ref 10–15)
HCT VFR BLD AUTO: 42.8 % (ref 40–53)
HGB BLD-MCNC: 14.4 G/DL (ref 13.3–17.7)
MCH RBC QN AUTO: 30.9 PG (ref 26.5–33)
MCHC RBC AUTO-ENTMCNC: 33.6 G/DL (ref 31.5–36.5)
MCV RBC AUTO: 92 FL (ref 78–100)
PLATELET # BLD AUTO: 203 10E3/UL (ref 150–450)
RBC # BLD AUTO: 4.66 10E6/UL (ref 4.4–5.9)
WBC # BLD AUTO: 6.1 10E3/UL (ref 4–11)

## 2025-07-17 NOTE — PATIENT INSTRUCTIONS
Patient Education   Preventive Care Advice   This is general advice given by our system to help you stay healthy. However, your care team may have specific advice just for you. Please talk to your care team about your preventive care needs.  Nutrition  Eat 5 or more servings of fruits and vegetables each day.  Try wheat bread, brown rice and whole grain pasta (instead of white bread, rice, and pasta).  Get enough calcium and vitamin D. Check the label on foods and aim for 100% of the RDA (recommended daily allowance).  Lifestyle  Exercise at least 150 minutes each week  (30 minutes a day, 5 days a week).  Do muscle strengthening activities 2 days a week. These help control your weight and prevent disease.  No smoking.  Wear sunscreen to prevent skin cancer.  Have a dental exam and cleaning every 6 months.  Yearly exams  See your health care team every year to talk about:  Any changes in your health.  Any medicines your care team has prescribed.  Preventive care, family planning, and ways to prevent chronic diseases.  Shots (vaccines)   HPV shots (up to age 26), if you've never had them before.  Hepatitis B shots (up to age 59), if you've never had them before.  COVID-19 shot: Get this shot when it's due.  Flu shot: Get a flu shot every year.  Tetanus shot: Get a tetanus shot every 10 years.  Pneumococcal, hepatitis A, and RSV shots: Ask your care team if you need these based on your risk.  Shingles shot (for age 50 and up)  General health tests  Diabetes screening:  Starting at age 35, Get screened for diabetes at least every 3 years.  If you are younger than age 35, ask your care team if you should be screened for diabetes.  Cholesterol test: At age 39, start having a cholesterol test every 5 years, or more often if advised.  Bone density scan (DEXA): At age 50, ask your care team if you should have this scan for osteoporosis (brittle bones).  Hepatitis C: Get tested at least once in your life.  STIs (sexually  transmitted infections)  Before age 24: Ask your care team if you should be screened for STIs.  After age 24: Get screened for STIs if you're at risk. You are at risk for STIs (including HIV) if:  You are sexually active with more than one person.  You don't use condoms every time.  You or a partner was diagnosed with a sexually transmitted infection.  If you are at risk for HIV, ask about PrEP medicine to prevent HIV.  Get tested for HIV at least once in your life, whether you are at risk for HIV or not.  Cancer screening tests  Cervical cancer screening: If you have a cervix, begin getting regular cervical cancer screening tests starting at age 21.  Breast cancer scan (mammogram): If you've ever had breasts, begin having regular mammograms starting at age 40. This is a scan to check for breast cancer.  Colon cancer screening: It is important to start screening for colon cancer at age 45.  Have a colonoscopy test every 10 years (or more often if you're at risk) Or, ask your provider about stool tests like a FIT test every year or Cologuard test every 3 years.  To learn more about your testing options, visit:   .  For help making a decision, visit:   https://bit.ly/ol68655.  Prostate cancer screening test: If you have a prostate, ask your care team if a prostate cancer screening test (PSA) at age 55 is right for you.  Lung cancer screening: If you are a current or former smoker ages 50 to 80, ask your care team if ongoing lung cancer screenings are right for you.  For informational purposes only. Not to replace the advice of your health care provider. Copyright   2023 [facility]. All rights reserved. Clinically reviewed by the [facility] Transitions Program. [employer] 955355 - REV 01/24.  9 Ways to Cut Back on Drinking  Maybe you've found yourself drinking more alcohol than you'd prefer. If you want to cut back, here are some ideas to try.    Think before you drink.  Do you really want a drink,  "or is it just a habit? If you're used to having a drink at a certain time, try doing something else then.     Look for substitutes.  Find some no-alcohol drinks that you enjoy, like flavored seltzer water, tea with honey, or tonic with a slice of lime. Or try alcohol-free beer or \"virgin\" cocktails (without the alcohol).     Drink more water.  Use water to quench your thirst. Drink a glass of water before you have any alcohol. Have another glass along with every drink or between drinks.     Shrink your drink.  For example, have a bottle of beer instead of a pint. Use a smaller glass for wine. Choose drinks with lower alcohol content (ABV%). Or use less liquor and more mixer in cocktails.     Slow down.  It's easy to drink quickly and without thinking about it. Pay attention, and make each drink last longer.     Do the math.  Total up how much you spend on alcohol each month. How much is that a year? If you cut back, what could you do with the money you save?     Take a break.  Choose a day or two each week when you won't drink at all. Notice how you feel on those days, physically and emotionally. How did you sleep? Do you feel better? Over time, add more break days.     Count calories.  Would you like to lose some weight? For some people that's a good motivator for cutting back. Figure out how many calories are in each drink. How many does that add up to in a day? In a week? In a month?     Practice saying no.  Be ready when someone offers you a drink. Try: \"Thanks, I've had enough.\" Or \"Thanks, but I'm cutting back.\" Or \"No, thanks. I feel better when I drink less.\"   Current as of: August 20, 2024  Content Version: 14.5 2024-2025 [employer].   Care instructions adapted under license by your healthcare professional. If you have questions about a medical condition or this instruction, always ask your healthcare professional. [employer] disclaims any warranty or liability for your use of " this information.

## 2025-07-17 NOTE — PROGRESS NOTES
Preventive Care Visit  [facility]  Lauri Beltrán MD, Family Medicine  Jul 17, 2025        Assessment & Plan     Routine general medical examination at a health care facility      Screening for cardiovascular condition    - Lipid panel reflex to direct LDL Fasting    Screening for prostate cancer    - PROSTATE SPEC ANTIGEN SCREEN    Screening, deficiency anemia, iron    - CBC with Platelets    Screening for diabetes mellitus    - COMPREHENSIVE METABOLIC PANEL      Consent was obtained from the patient to use an AI documentation tool in the creation of this note.        Counseling  Appropriate preventive services were addressed with this patient via screening, questionnaire, or discussion as appropriate for fall prevention, nutrition, physical activity, social engagement, weight loss and cognition.  Checklist reviewing preventive services available has been given to the patient.  Reviewed patient's diet, addressing concerns and/or questions.     Patient has been advised of split billing requirements and indicates understanding: N/A    Return in about 1 day (around 7/18/2025).    Follow-up Visit   Expected date:  Jul 17, 2026 (Approximate)      Follow Up Appointment Details:     Follow-up with whom?: PCP    Follow-Up for what?: Adult Preventive    How?: In Person                     Armond Beltrán MD     [address] [address]  [email or web address]     Office: [phone]           Barrett Zamudio is a 54 year old, presenting for the following:  Physical        7/17/2025     2:47 PM   Additional Questions   Roomed by fernanda VENEGAS   Consent was obtained from the patient to use an AI documentation tool in the creation of this note.    Accompanied by self          HPI  He reports a rash present for about one week prior to the visit, affecting neck, shoulder, and arm.  - Rash was itchy  - Used skin cream or lotion, including medicated/steroid  cream  - Rash has resolved by the time of the visit    Neurologic symptoms  - Headaches or dizziness reported previously, now improved    Misc  - No fevers, cough, chest pain, shortness of breath, wheezing, diarrhea, constipation, nausea, blood in urine, blood in stool, or hemorrhoids reported prior to the visit    Advance Care Planning    Discussed advance care planning with patient; however, patient declined at this time.        7/12/2025   General Health   How would you rate your overall physical health? Good   Feel stress (tense, anxious, or unable to sleep) Patient declined         7/12/2025   Nutrition   Three or more servings of calcium each day? (!) I DON'T KNOW   Diet: Regular (no restrictions)   How many servings of fruit and vegetables per day? (!) I DON'T KNOW   How many sweetened beverages each day? (!) I DON'T KNOW         7/12/2025   Exercise   Days per week of moderate/strenous exercise 3 days   Average minutes spent exercising at this level 10 min         7/12/2025   Social Factors   Worry food won't last until get money to buy more Patient declined   Food not last or not have enough money for food? Patient declined   Do you have housing? (Housing is defined as stable permanent housing and does not include staying outside in a car, in a tent, in an abandoned building, in an overnight shelter, or couch-surfing.) Patient declined   Are you worried about losing your housing? Patient declined   Lack of transportation? Patient declined   Unable to get utilities (heat,electricity)? Patient declined         7/12/2025   Fall Risk   Fallen 2 or more times in the past year? No   Trouble with walking or balance? No          7/12/2025   Dental   Dentist two times every year? Yes         Today's PHQ-2 Score:       7/17/2025     2:38 PM   PHQ-2 ( 1999 [employer])   Q1: Little interest or pleasure in doing things 0   Q2: Feeling down, depressed or hopeless 0   PHQ-2 Score 0    Q1: Little interest or pleasure in doing  things Not at all   Q2: Feeling down, depressed or hopeless Not at all   PHQ-2 Score 0       Patient-reported           7/12/2025   Substance Use   Alcohol more than 3/day or more than 7/wk Yes   How often do you have a drink containing alcohol 2 to 4 times a month   How many alcohol drinks on typical day 1 or 2   How often do you have 5+ drinks at one occasion Never   Audit 2/3 Score 0   How often not able to stop drinking once started Never   How often failed to do what normally expected Never   How often needed first drink in am after a heavy drinking session Never   How often feeling of guilt or remorse after drinking Never   How often unable to remember what happened the night before Never   Have you or someone else been injured because of your drinking No   Has anyone been concerned or suggested you cut down on drinking No   TOTAL SCORE - AUDIT 2   Do you use any other substances recreationally? No     Social History     Tobacco Use    Smoking status: Never    Smokeless tobacco: Never   Vaping Use    Vaping status: Never Used   Substance Use Topics    Alcohol use: Yes     Alcohol/week: 1.7 standard drinks of alcohol     Types: 2 Standard drinks or equivalent per week     Comment: occasionally    Drug use: No           7/12/2025   STI Screening   New sexual partner(s) since last STI/HIV test? No   Last PSA:   PSA   Date Value Ref Range Status   08/28/2015 0.81 0 - 4 ug/L Final     Prostate Specific Antigen Screen   Date Value Ref Range Status   07/18/2024 1.09 0.00 - 3.50 ng/mL Final   12/30/2021 0.81 0.00 - 4.00 ug/L Final     ASCVD Risk   The 10-year ASCVD risk score (Sedrick MICHEL, et al., 2019) is: 4.6%    Values used to calculate the score:      Age: 54 years      Sex: Male      Is Non- : No      Diabetic: No      Tobacco smoker: No      Systolic Blood Pressure: 128 mmHg      Is BP treated: No      HDL Cholesterol: 59 mg/dL      Total Cholesterol: 175 mg/dL           Reviewed  "and updated as needed this visit by Provider   Tobacco  Allergies  Meds  Problems  Med Hx  Surg Hx  Fam Hx               Objective    Exam  /78   Pulse 83   Temp (!) 96.7  F (35.9  C) (Tympanic)   Resp 18   Ht 1.543 m (5' 0.75\")   Wt 52.2 kg (115 lb)   SpO2 100%   BMI 21.91 kg/m     Estimated body mass index is 21.91 kg/m  as calculated from the following:    Height as of this encounter: 1.543 m (5' 0.75\").    Weight as of this encounter: 52.2 kg (115 lb).    Physical Exam  GENERAL: healthy, alert and no distress  EYES: Eyes grossly normal to inspection, PERRL and conjunctivae and sclerae normal  HENT: ear canals and TM's normal, nose and mouth without ulcers or lesions  NECK: no adenopathy, no asymmetry, masses, or scars and thyroid normal to palpation  RESP: lungs clear to auscultation - no rales, rhonchi or wheezes  BREAST: normal without masses, tenderness or nipple discharge and no palpable axillary masses or adenopathy  CV: regular rate and rhythm, normal S1 S2, no S3 or S4, no murmur, click or rub, no peripheral edema and peripheral pulses strong  ABDOMEN: soft, nontender, no hepatosplenomegaly, no masses and bowel sounds normal   (male): normal male genitalia without lesions or urethral discharge, no hernia  MS: no gross musculoskeletal defects noted, no edema  SKIN: no suspicious lesions or rashes  NEURO: Normal strength and tone, mentation intact and speech normal  PSYCH: mentation appears normal, affect normal/bright  LYMPH: no cervical, supraclavicular, axillary, or inguinal adenopathy   RECTAL: declined exam      Signed Electronically by: Lauri Beltrán MD    "

## (undated) DEVICE — WIPES FOLEY CARE SURESTEP PROVON DFC100

## (undated) DEVICE — STRAP UNIVERSAL POSITIONING 2-PIECE 4X47X76" 91-287

## (undated) DEVICE — NDL ANGIOCATH 14GA 1.25" 4048

## (undated) DEVICE — SU NUROLON 4-0 TF CR 8X18" C584D

## (undated) DEVICE — DRAPE WARMER 66X44" ORS-300

## (undated) DEVICE — BLADE KNIFE BEAVER MICROSHARP GREEN 377515

## (undated) DEVICE — SU PROLENE 3-0 FS-2 18" 8665G

## (undated) DEVICE — SYR 30ML LL W/O NDL 302832

## (undated) DEVICE — ESU CORD BIPOLAR AND IRR TUBING AESCULAP US355

## (undated) DEVICE — DRAPE MAYO STAND 23X54 8337

## (undated) DEVICE — PREP POVIDONE IODINE SCRUB 7.5% 4OZ APL82212

## (undated) DEVICE — DRSG PRIMAPORE 03 1/8X6" 66000318

## (undated) DEVICE — PAD CHUX UNDERPAD 23X24" 7136

## (undated) DEVICE — BUR STRK 1.1MM STRAIGHT ROUTER 5820-070-011

## (undated) DEVICE — SOL WATER IRRIG 1000ML BOTTLE 2F7114

## (undated) DEVICE — PREP POVIDONE IODINE SOLUTION 10% 4OZ

## (undated) DEVICE — SYR 10ML FINGER CONTROL W/O NDL 309695

## (undated) DEVICE — DRAPE EYE SHEET 8441

## (undated) DEVICE — NDL BLUNT FILL 18GA 1 1/2" 305064

## (undated) DEVICE — TUBING SUCTION MEDI-VAC SOFT 3/16"X20' N520A

## (undated) DEVICE — BUR STRK 2.3MM TAPERED ROUTER - FA2 5407-FA2-023

## (undated) DEVICE — PACK GOWN 3/PK DISP XL SBA32GPFCB

## (undated) DEVICE — SPONGE SURGIFOAM 100 1974

## (undated) DEVICE — SOL RINGERS LACTATED 1000ML BAG 07953-09

## (undated) DEVICE — GLOVE PROTEXIS MICRO 7.0  2D73PM70

## (undated) DEVICE — LINEN TOWEL PACK X30 5481

## (undated) DEVICE — SUCTION SPETZLER MICROVAC 3FR 3101-3

## (undated) DEVICE — ESU GROUND PAD ADULT W/CORD E7507

## (undated) DEVICE — CATH TRAY FOLEY SURESTEP 16FR W/URNE MTR STLK LATEX A303316A

## (undated) DEVICE — Device

## (undated) DEVICE — DRAPE CRANIOTOMY W/POUCH 9450

## (undated) DEVICE — DRAPE MICROSCOPE LEICA 54X150" AR8033650

## (undated) DEVICE — DRAPE POUCH INSTRUMENT 1018

## (undated) DEVICE — PREP CHLORAPREP CLEAR 3ML 260400

## (undated) DEVICE — SPONGE COTTONOID 1/2X1 1/2" 20-06S

## (undated) DEVICE — IOM CASE FEE

## (undated) DEVICE — SU VICRYL 2-0 CT-2 CR 8X18" J726D

## (undated) DEVICE — KIT ENDO TURNOVER/PROCEDURE W/CLEAN A SCOPE LINERS 103888

## (undated) DEVICE — LINEN TOWEL PACK X6 WHITE 5487

## (undated) DEVICE — RETR ELASTIC STAYS LONE STAR BLUNT DUAL LEAD 3550-1G

## (undated) DEVICE — SPONGE COTTONOID 1X3" 20-10S

## (undated) DEVICE — SU ETHILON 3-0 PS-1 18" 1663H

## (undated) DEVICE — NDL ANGIOCATH 14GA 5.25" 382269

## (undated) DEVICE — SYR 10ML LL W/O NDL 302995

## (undated) DEVICE — SURGICEL HEMOSTAT 4X8" 1952

## (undated) DEVICE — DECANTER BAG 2002S

## (undated) DEVICE — INTRO SHEATH 5FRX10CM PINNACLE MARKER RSB512

## (undated) DEVICE — PREP SKIN SCRUB TRAY 4461A

## (undated) DEVICE — DECANTER VIAL 2006S

## (undated) DEVICE — RX BACITRACIN OINTMENT 0.9G 1/32OZ CUR001109

## (undated) DEVICE — SPONGE COTTONOID 1/2X3" 20-07S

## (undated) DEVICE — SPONGE SURGIFOAM 01GM POWDER 1978

## (undated) DEVICE — PERFORATOR 14MM CODMAN

## (undated) DEVICE — SPONGE COTTONOID 1/2X1/2" 20-04S

## (undated) DEVICE — PIN SKULL MAYFIELD ADULT TITANIUM 3/PK A1120

## (undated) DEVICE — PACK CRANIOTOMY

## (undated) DEVICE — DRAPE SHEET MED 44X70" 9355

## (undated) DEVICE — LINEN TOWEL PACK X5 5464

## (undated) DEVICE — IOM SUPPLIES

## (undated) DEVICE — NDL BLUNT 18GA 1" W/O FILTER 305181

## (undated) DEVICE — INTRODUCER SET MICROPUNCTURE 5FRX10CM G48007

## (undated) DEVICE — BLADE CLIPPER SGL USE 9680

## (undated) DEVICE — DRSG TEGADERM 4X4 3/4" 1626W

## (undated) DEVICE — MARKER SPHERES PASSIVE MEDT PACK 5 8801075

## (undated) RX ORDER — HEPARIN SODIUM 1000 [USP'U]/ML
INJECTION, SOLUTION INTRAVENOUS; SUBCUTANEOUS
Status: DISPENSED
Start: 2021-01-25

## (undated) RX ORDER — PHENYLEPHRINE HCL IN 0.9% NACL 1 MG/10 ML
SYRINGE (ML) INTRAVENOUS
Status: DISPENSED
Start: 2019-12-27

## (undated) RX ORDER — FENTANYL CITRATE 50 UG/ML
INJECTION, SOLUTION INTRAMUSCULAR; INTRAVENOUS
Status: DISPENSED
Start: 2019-12-27

## (undated) RX ORDER — HYDROMORPHONE HYDROCHLORIDE 1 MG/ML
INJECTION, SOLUTION INTRAMUSCULAR; INTRAVENOUS; SUBCUTANEOUS
Status: DISPENSED
Start: 2019-12-27

## (undated) RX ORDER — CEFAZOLIN SODIUM 1 G/3ML
INJECTION, POWDER, FOR SOLUTION INTRAMUSCULAR; INTRAVENOUS
Status: DISPENSED
Start: 2019-12-27

## (undated) RX ORDER — ACETAMINOPHEN 325 MG/1
TABLET ORAL
Status: DISPENSED
Start: 2019-12-27

## (undated) RX ORDER — FENTANYL CITRATE 50 UG/ML
INJECTION, SOLUTION INTRAMUSCULAR; INTRAVENOUS
Status: DISPENSED
Start: 2021-01-25

## (undated) RX ORDER — EPHEDRINE SULFATE 50 MG/ML
INJECTION, SOLUTION INTRAMUSCULAR; INTRAVENOUS; SUBCUTANEOUS
Status: DISPENSED
Start: 2019-12-27

## (undated) RX ORDER — FENTANYL CITRATE 50 UG/ML
INJECTION, SOLUTION INTRAMUSCULAR; INTRAVENOUS
Status: DISPENSED
Start: 2019-09-12

## (undated) RX ORDER — GABAPENTIN 300 MG/1
CAPSULE ORAL
Status: DISPENSED
Start: 2019-12-27

## (undated) RX ORDER — LIDOCAINE HYDROCHLORIDE 20 MG/ML
INJECTION, SOLUTION EPIDURAL; INFILTRATION; INTRACAUDAL; PERINEURAL
Status: DISPENSED
Start: 2019-12-27

## (undated) RX ORDER — ONDANSETRON 2 MG/ML
INJECTION INTRAMUSCULAR; INTRAVENOUS
Status: DISPENSED
Start: 2019-12-27

## (undated) RX ORDER — LIDOCAINE HYDROCHLORIDE 10 MG/ML
INJECTION, SOLUTION EPIDURAL; INFILTRATION; INTRACAUDAL; PERINEURAL
Status: DISPENSED
Start: 2019-09-12

## (undated) RX ORDER — LIDOCAINE HYDROCHLORIDE AND EPINEPHRINE 10; 10 MG/ML; UG/ML
INJECTION, SOLUTION INFILTRATION; PERINEURAL
Status: DISPENSED
Start: 2019-12-27

## (undated) RX ORDER — DEXAMETHASONE SODIUM PHOSPHATE 4 MG/ML
INJECTION, SOLUTION INTRA-ARTICULAR; INTRALESIONAL; INTRAMUSCULAR; INTRAVENOUS; SOFT TISSUE
Status: DISPENSED
Start: 2019-12-27

## (undated) RX ORDER — SODIUM CHLORIDE 9 MG/ML
INJECTION, SOLUTION INTRAVENOUS
Status: DISPENSED
Start: 2019-09-12

## (undated) RX ORDER — ATROPINE SULFATE 0.1 MG/ML
INJECTION INTRAVENOUS
Status: DISPENSED
Start: 2024-01-02

## (undated) RX ORDER — VERAPAMIL HYDROCHLORIDE 2.5 MG/ML
INJECTION, SOLUTION INTRAVENOUS
Status: DISPENSED
Start: 2021-01-25

## (undated) RX ORDER — LIDOCAINE HYDROCHLORIDE 10 MG/ML
INJECTION, SOLUTION EPIDURAL; INFILTRATION; INTRACAUDAL; PERINEURAL
Status: DISPENSED
Start: 2021-01-25

## (undated) RX ORDER — GLYCOPYRROLATE 0.2 MG/ML
INJECTION, SOLUTION INTRAMUSCULAR; INTRAVENOUS
Status: DISPENSED
Start: 2019-12-27

## (undated) RX ORDER — ALBUMIN, HUMAN INJ 5% 5 %
SOLUTION INTRAVENOUS
Status: DISPENSED
Start: 2019-12-27

## (undated) RX ORDER — BACITRACIN 50000 [IU]/1
INJECTION, POWDER, FOR SOLUTION INTRAMUSCULAR
Status: DISPENSED
Start: 2019-12-27

## (undated) RX ORDER — CEFAZOLIN SODIUM 2 G/100ML
INJECTION, SOLUTION INTRAVENOUS
Status: DISPENSED
Start: 2019-12-27

## (undated) RX ORDER — HEPARIN SODIUM 1000 [USP'U]/ML
INJECTION, SOLUTION INTRAVENOUS; SUBCUTANEOUS
Status: DISPENSED
Start: 2019-12-27

## (undated) RX ORDER — PAPAVERINE HYDROCHLORIDE 30 MG/ML
INJECTION INTRAMUSCULAR; INTRAVENOUS
Status: DISPENSED
Start: 2019-12-27

## (undated) RX ORDER — SODIUM CHLORIDE 9 MG/ML
INJECTION, SOLUTION INTRAVENOUS
Status: DISPENSED
Start: 2019-12-27

## (undated) RX ORDER — HYDRALAZINE HYDROCHLORIDE 20 MG/ML
INJECTION INTRAMUSCULAR; INTRAVENOUS
Status: DISPENSED
Start: 2019-12-27

## (undated) RX ORDER — SODIUM CHLORIDE 9 MG/ML
INJECTION, SOLUTION INTRAVENOUS
Status: DISPENSED
Start: 2021-01-25

## (undated) RX ORDER — LABETALOL 20 MG/4 ML (5 MG/ML) INTRAVENOUS SYRINGE
Status: DISPENSED
Start: 2019-12-27

## (undated) RX ORDER — NITROGLYCERIN 5 MG/ML
VIAL (ML) INTRAVENOUS
Status: DISPENSED
Start: 2021-01-25

## (undated) RX ORDER — PROPOFOL 10 MG/ML
INJECTION, EMULSION INTRAVENOUS
Status: DISPENSED
Start: 2019-12-27

## (undated) RX ORDER — FENTANYL CITRATE 50 UG/ML
INJECTION, SOLUTION INTRAMUSCULAR; INTRAVENOUS
Status: DISPENSED
Start: 2024-01-02